# Patient Record
Sex: FEMALE | Race: BLACK OR AFRICAN AMERICAN | NOT HISPANIC OR LATINO | URBAN - METROPOLITAN AREA
[De-identification: names, ages, dates, MRNs, and addresses within clinical notes are randomized per-mention and may not be internally consistent; named-entity substitution may affect disease eponyms.]

---

## 2018-02-14 ENCOUNTER — EMERGENCY (EMERGENCY)
Facility: HOSPITAL | Age: 78
LOS: 1 days | Discharge: HOME | End: 2018-02-14
Attending: EMERGENCY MEDICINE | Admitting: EMERGENCY MEDICINE
Payer: MEDICARE

## 2018-02-14 VITALS
DIASTOLIC BLOOD PRESSURE: 63 MMHG | HEART RATE: 89 BPM | OXYGEN SATURATION: 99 % | SYSTOLIC BLOOD PRESSURE: 152 MMHG | RESPIRATION RATE: 18 BRPM | TEMPERATURE: 98 F

## 2018-02-14 DIAGNOSIS — Z90.710 ACQUIRED ABSENCE OF BOTH CERVIX AND UTERUS: Chronic | ICD-10-CM

## 2018-02-14 DIAGNOSIS — Z98.89 OTHER SPECIFIED POSTPROCEDURAL STATES: Chronic | ICD-10-CM

## 2018-02-14 DIAGNOSIS — Z90.2 ACQUIRED ABSENCE OF LUNG [PART OF]: Chronic | ICD-10-CM

## 2018-02-14 LAB
ALBUMIN SERPL ELPH-MCNC: 4.3 G/DL — SIGNIFICANT CHANGE UP (ref 3.3–5)
ALP SERPL-CCNC: 54 U/L — SIGNIFICANT CHANGE UP (ref 40–120)
ALT FLD-CCNC: 12 U/L — SIGNIFICANT CHANGE UP (ref 4–33)
AST SERPL-CCNC: 20 U/L — SIGNIFICANT CHANGE UP (ref 4–32)
BASE EXCESS BLDV CALC-SCNC: 6.2 MMOL/L — SIGNIFICANT CHANGE UP
BASOPHILS # BLD AUTO: 0.06 K/UL — SIGNIFICANT CHANGE UP (ref 0–0.2)
BASOPHILS NFR BLD AUTO: 0.6 % — SIGNIFICANT CHANGE UP (ref 0–2)
BILIRUB SERPL-MCNC: 0.2 MG/DL — SIGNIFICANT CHANGE UP (ref 0.2–1.2)
BLOOD GAS VENOUS - CREATININE: 1.2 MG/DL — SIGNIFICANT CHANGE UP (ref 0.5–1.3)
BUN SERPL-MCNC: 19 MG/DL — SIGNIFICANT CHANGE UP (ref 7–23)
CALCIUM SERPL-MCNC: 9.1 MG/DL — SIGNIFICANT CHANGE UP (ref 8.4–10.5)
CHLORIDE BLDV-SCNC: 102 MMOL/L — SIGNIFICANT CHANGE UP (ref 96–108)
CHLORIDE SERPL-SCNC: 98 MMOL/L — SIGNIFICANT CHANGE UP (ref 98–107)
CK SERPL-CCNC: 120 U/L — SIGNIFICANT CHANGE UP (ref 25–170)
CO2 SERPL-SCNC: 28 MMOL/L — SIGNIFICANT CHANGE UP (ref 22–31)
CREAT SERPL-MCNC: 1.27 MG/DL — SIGNIFICANT CHANGE UP (ref 0.5–1.3)
EOSINOPHIL # BLD AUTO: 0.33 K/UL — SIGNIFICANT CHANGE UP (ref 0–0.5)
EOSINOPHIL NFR BLD AUTO: 3.4 % — SIGNIFICANT CHANGE UP (ref 0–6)
GAS PNL BLDV: 137 MMOL/L — SIGNIFICANT CHANGE UP (ref 136–146)
GLUCOSE BLDV-MCNC: 165 — HIGH (ref 70–99)
GLUCOSE SERPL-MCNC: 172 MG/DL — HIGH (ref 70–99)
HCO3 BLDV-SCNC: 29 MMOL/L — HIGH (ref 20–27)
HCT VFR BLD CALC: 37.7 % — SIGNIFICANT CHANGE UP (ref 34.5–45)
HCT VFR BLDV CALC: 36.1 % — SIGNIFICANT CHANGE UP (ref 34.5–45)
HGB BLD-MCNC: 12.1 G/DL — SIGNIFICANT CHANGE UP (ref 11.5–15.5)
HGB BLDV-MCNC: 11.7 G/DL — SIGNIFICANT CHANGE UP (ref 11.5–15.5)
IMM GRANULOCYTES # BLD AUTO: 0.03 # — SIGNIFICANT CHANGE UP
IMM GRANULOCYTES NFR BLD AUTO: 0.3 % — SIGNIFICANT CHANGE UP (ref 0–1.5)
LACTATE BLDV-MCNC: 1.6 MMOL/L — SIGNIFICANT CHANGE UP (ref 0.5–2)
LYMPHOCYTES # BLD AUTO: 1.86 K/UL — SIGNIFICANT CHANGE UP (ref 1–3.3)
LYMPHOCYTES # BLD AUTO: 19.4 % — SIGNIFICANT CHANGE UP (ref 13–44)
MCHC RBC-ENTMCNC: 29.3 PG — SIGNIFICANT CHANGE UP (ref 27–34)
MCHC RBC-ENTMCNC: 32.1 % — SIGNIFICANT CHANGE UP (ref 32–36)
MCV RBC AUTO: 91.3 FL — SIGNIFICANT CHANGE UP (ref 80–100)
MONOCYTES # BLD AUTO: 0.66 K/UL — SIGNIFICANT CHANGE UP (ref 0–0.9)
MONOCYTES NFR BLD AUTO: 6.9 % — SIGNIFICANT CHANGE UP (ref 2–14)
NEUTROPHILS # BLD AUTO: 6.65 K/UL — SIGNIFICANT CHANGE UP (ref 1.8–7.4)
NEUTROPHILS NFR BLD AUTO: 69.4 % — SIGNIFICANT CHANGE UP (ref 43–77)
NRBC # FLD: 0 — SIGNIFICANT CHANGE UP
PCO2 BLDV: 51 MMHG — SIGNIFICANT CHANGE UP (ref 41–51)
PH BLDV: 7.4 PH — SIGNIFICANT CHANGE UP (ref 7.32–7.43)
PLATELET # BLD AUTO: 279 K/UL — SIGNIFICANT CHANGE UP (ref 150–400)
PMV BLD: 9.6 FL — SIGNIFICANT CHANGE UP (ref 7–13)
PO2 BLDV: 65 MMHG — HIGH (ref 35–40)
POTASSIUM BLDV-SCNC: 3.8 MMOL/L — SIGNIFICANT CHANGE UP (ref 3.4–4.5)
POTASSIUM SERPL-MCNC: 4.2 MMOL/L — SIGNIFICANT CHANGE UP (ref 3.5–5.3)
POTASSIUM SERPL-SCNC: 4.2 MMOL/L — SIGNIFICANT CHANGE UP (ref 3.5–5.3)
PROT SERPL-MCNC: 7.8 G/DL — SIGNIFICANT CHANGE UP (ref 6–8.3)
RBC # BLD: 4.13 M/UL — SIGNIFICANT CHANGE UP (ref 3.8–5.2)
RBC # FLD: 13.7 % — SIGNIFICANT CHANGE UP (ref 10.3–14.5)
SAO2 % BLDV: 91.4 % — HIGH (ref 60–85)
SODIUM SERPL-SCNC: 140 MMOL/L — SIGNIFICANT CHANGE UP (ref 135–145)
WBC # BLD: 9.59 K/UL — SIGNIFICANT CHANGE UP (ref 3.8–10.5)
WBC # FLD AUTO: 9.59 K/UL — SIGNIFICANT CHANGE UP (ref 3.8–10.5)

## 2018-02-14 PROCEDURE — 70450 CT HEAD/BRAIN W/O DYE: CPT | Mod: 26

## 2018-02-14 PROCEDURE — 93010 ELECTROCARDIOGRAM REPORT: CPT

## 2018-02-14 PROCEDURE — 99285 EMERGENCY DEPT VISIT HI MDM: CPT | Mod: 25

## 2018-02-14 NOTE — ED ADULT TRIAGE NOTE - CHIEF COMPLAINT QUOTE
Patient was sitting and eating dinner, became limp, dazed for a couple of minutes, When she came around she was confused and had trouble speaking. . All symptoms have subsided. She is back at her baseline. She had a similar episode about a year ago.

## 2018-02-14 NOTE — ED ADULT NURSE NOTE - OBJECTIVE STATEMENT
Pt arrives to ED s/p a few moments of witnessed confusion while eating dinner.  Pt was "dazed" and confused and went limp briefly. Pt currently A&Ox3 and all witnessed symptoms have subsided.  Dr. Brown assessing pt at bedside.  20g iv placed to LAC, labs drawn and sent.  Pt family at bedside.  Pt denies any pain.  Pt states pt had a similar episode a year ago.  Awaiting urine sample.

## 2018-02-14 NOTE — ED PROVIDER NOTE - CRANIAL NERVE AND PUPILLARY EXAM
central vision intact/extra-ocular movements intact/peripheral vision intact/cranial nerves 2-12 intact

## 2018-02-14 NOTE — ED PROVIDER NOTE - OBJECTIVE STATEMENT
76 yo f, former smoker, hx non insulin dependent T2DM, HTN, lung cancer s/p L lobectomy 2016, fo 76 yo f, former smoker x 30 years ago, hx non insulin dependent T2DM, HTN, lung cancer s/p L lobectomy 2016, BIBA, accompanied by daughter and granddaughter who states patient was unresponsive at dinner time for 3-4 minutes. Granddaughter states that she was sitting across from her and noticed her eyes roll back, head drop and not responding for several minutes, then coughing up undigested food on her own, with speech "different" (denies slurred speech but states it was not her voice). Pt reports remembering this incident as well as what happened before/after. Denies biting tongue/urinating on herself. FSG on EMS arrival 239. Pt's daughter reports similar episode one year ago. Denies history of seizures, stroke, or MI. Denies fevers, chills, n/v, changes in vision, HA, motor weakness/parasthesias/tingling. walks with walker at baseline.

## 2018-02-14 NOTE — ED ADULT NURSE NOTE - CHPI ED SYMPTOMS NEG
no diaphoresis/no back pain/no chest pain/no dizziness/no fever/no chills/no shortness of breath/no vomiting/no nausea/no cough

## 2018-02-14 NOTE — ED PROVIDER NOTE - ATTENDING CONTRIBUTION TO CARE
IRMA Attending Note - Dr. Brown  76 yo f, former smoker x 30 years ago, hx non insulin dependent T2DM, HTN, lung cancer s/p L lobectomy 2016, BIBA, accompanied by daughter and granddaughter who states patient was unresponsive at dinner time for 3-4 minutes and then had an episode of coughing..  PE: pt is alert and oriented and back to base line. , perrl, ent normal, membranes are moist, neck supple. no lymphadenopathy or thyroid enlargement, No JVD.  Chest clear to P&A, Heart- reg rhythm without murmur, rubs or gallops, radial pulses equal bilaterally.  Abd is soft, non-tender, Bowel sounds are active. no mass or organomegaly. : No CVA tenderness. Neuro:  Pt alert and oriented x 3. Perrl    Distal neurosensory is intact. Motor function is 5/5 strength bilaterally.  No focal deficits. Extremities:  No edema.  Skin: warm and dry.  Impression: syncope   Plan:  Labs with CBC to R/O anemia, CMP,U/A looking for causes of weakness: such as infection, electrolyte abnormality, dehydration, EKG looking for cardiac etiology,  endocrinopathies such as Thyroid, and hyperglycemia, CXR to R/O pneumonia

## 2018-02-14 NOTE — ED PROVIDER NOTE - PROGRESS NOTE DETAILS
Seferino att: Spoke with family who would like to take their mother/grandmother home as well as the patient who agrees and is AAOx 3. The family says they have a good relationship with their pmd (Dr. Jaycob ragsdale [phone]) and can see him this morning. After speaking with pmd, he agrees that he can facilitate an echo, cards follow up this morning. If troponin and ua is wnl, we may d/c to follow with pmd in a few hours.   I explained the risks of d/cing the pt without a full inpatient syncope work-up and the pt and family insist on discharge despite understanding those risks.

## 2018-02-14 NOTE — ED ADULT NURSE NOTE - PMH
Coronary arteriosclerosis    Diabetes    Former smoker  Quit ~2008  Hyperlipidemia    Hypertension    Lung cancer  (s/p left lobectomy)  Peripheral arterial disease    Valvular heart disease

## 2018-02-14 NOTE — ED ADULT NURSE NOTE - PSH
H/O:  section    S/P coronary angiogram  no stent.  50% stenosis mid RCA [facility] otherwise no stenosis  S/P lobectomy of lung    S/P total abdominal hysterectomy

## 2018-02-15 VITALS
TEMPERATURE: 98 F | RESPIRATION RATE: 18 BRPM | SYSTOLIC BLOOD PRESSURE: 132 MMHG | DIASTOLIC BLOOD PRESSURE: 54 MMHG | HEART RATE: 93 BPM | OXYGEN SATURATION: 98 %

## 2018-02-15 LAB
APPEARANCE UR: CLEAR — SIGNIFICANT CHANGE UP
BACTERIA # UR AUTO: SIGNIFICANT CHANGE UP
BILIRUB UR-MCNC: NEGATIVE — SIGNIFICANT CHANGE UP
BLOOD UR QL VISUAL: NEGATIVE — SIGNIFICANT CHANGE UP
CK MB BLD-MCNC: 2.11 NG/ML — SIGNIFICANT CHANGE UP (ref 1–4.7)
CK MB BLD-MCNC: SIGNIFICANT CHANGE UP (ref 0–2.5)
CK SERPL-CCNC: 120 U/L — SIGNIFICANT CHANGE UP (ref 25–170)
COLOR SPEC: YELLOW — SIGNIFICANT CHANGE UP
GLUCOSE UR-MCNC: NEGATIVE — SIGNIFICANT CHANGE UP
HYALINE CASTS # UR AUTO: SIGNIFICANT CHANGE UP (ref 0–?)
KETONES UR-MCNC: NEGATIVE — SIGNIFICANT CHANGE UP
LEUKOCYTE ESTERASE UR-ACNC: NEGATIVE — SIGNIFICANT CHANGE UP
MUCOUS THREADS # UR AUTO: SIGNIFICANT CHANGE UP
NITRITE UR-MCNC: NEGATIVE — SIGNIFICANT CHANGE UP
PH UR: 5.5 — SIGNIFICANT CHANGE UP (ref 4.6–8)
PROT UR-MCNC: 20 MG/DL — SIGNIFICANT CHANGE UP
RBC CASTS # UR COMP ASSIST: SIGNIFICANT CHANGE UP (ref 0–?)
SP GR SPEC: 1.02 — SIGNIFICANT CHANGE UP (ref 1–1.04)
SQUAMOUS # UR AUTO: SIGNIFICANT CHANGE UP
TROPONIN T SERPL-MCNC: < 0.06 NG/ML — SIGNIFICANT CHANGE UP (ref 0–0.06)
UROBILINOGEN FLD QL: NORMAL MG/DL — SIGNIFICANT CHANGE UP
WBC UR QL: SIGNIFICANT CHANGE UP (ref 0–?)

## 2019-02-27 ENCOUNTER — EMERGENCY (EMERGENCY)
Facility: HOSPITAL | Age: 79
LOS: 1 days | Discharge: HOME | End: 2019-02-27
Attending: EMERGENCY MEDICINE | Admitting: EMERGENCY MEDICINE
Payer: MEDICARE

## 2019-02-27 VITALS
TEMPERATURE: 97 F | HEART RATE: 81 BPM | SYSTOLIC BLOOD PRESSURE: 140 MMHG | RESPIRATION RATE: 17 BRPM | OXYGEN SATURATION: 99 % | DIASTOLIC BLOOD PRESSURE: 61 MMHG

## 2019-02-27 DIAGNOSIS — Z98.89 OTHER SPECIFIED POSTPROCEDURAL STATES: Chronic | ICD-10-CM

## 2019-02-27 DIAGNOSIS — Z90.710 ACQUIRED ABSENCE OF BOTH CERVIX AND UTERUS: Chronic | ICD-10-CM

## 2019-02-27 DIAGNOSIS — Z90.2 ACQUIRED ABSENCE OF LUNG [PART OF]: Chronic | ICD-10-CM

## 2019-02-27 LAB
ANION GAP SERPL CALC-SCNC: 17 MMO/L — HIGH (ref 7–14)
BASOPHILS # BLD AUTO: 0.04 K/UL — SIGNIFICANT CHANGE UP (ref 0–0.2)
BASOPHILS NFR BLD AUTO: 0.6 % — SIGNIFICANT CHANGE UP (ref 0–2)
BUN SERPL-MCNC: 23 MG/DL — SIGNIFICANT CHANGE UP (ref 7–23)
CALCIUM SERPL-MCNC: 9.6 MG/DL — SIGNIFICANT CHANGE UP (ref 8.4–10.5)
CHLORIDE SERPL-SCNC: 99 MMOL/L — SIGNIFICANT CHANGE UP (ref 98–107)
CO2 SERPL-SCNC: 21 MMOL/L — LOW (ref 22–31)
CREAT SERPL-MCNC: 0.96 MG/DL — SIGNIFICANT CHANGE UP (ref 0.5–1.3)
EOSINOPHIL # BLD AUTO: 0.07 K/UL — SIGNIFICANT CHANGE UP (ref 0–0.5)
EOSINOPHIL NFR BLD AUTO: 1 % — SIGNIFICANT CHANGE UP (ref 0–6)
GLUCOSE SERPL-MCNC: 245 MG/DL — HIGH (ref 70–99)
HCT VFR BLD CALC: 38.1 % — SIGNIFICANT CHANGE UP (ref 34.5–45)
HGB BLD-MCNC: 11.6 G/DL — SIGNIFICANT CHANGE UP (ref 11.5–15.5)
IMM GRANULOCYTES NFR BLD AUTO: 0.4 % — SIGNIFICANT CHANGE UP (ref 0–1.5)
LYMPHOCYTES # BLD AUTO: 1.63 K/UL — SIGNIFICANT CHANGE UP (ref 1–3.3)
LYMPHOCYTES # BLD AUTO: 23.9 % — SIGNIFICANT CHANGE UP (ref 13–44)
MCHC RBC-ENTMCNC: 29.1 PG — SIGNIFICANT CHANGE UP (ref 27–34)
MCHC RBC-ENTMCNC: 30.4 % — LOW (ref 32–36)
MCV RBC AUTO: 95.7 FL — SIGNIFICANT CHANGE UP (ref 80–100)
MONOCYTES # BLD AUTO: 0.51 K/UL — SIGNIFICANT CHANGE UP (ref 0–0.9)
MONOCYTES NFR BLD AUTO: 7.5 % — SIGNIFICANT CHANGE UP (ref 2–14)
NEUTROPHILS # BLD AUTO: 4.55 K/UL — SIGNIFICANT CHANGE UP (ref 1.8–7.4)
NEUTROPHILS NFR BLD AUTO: 66.6 % — SIGNIFICANT CHANGE UP (ref 43–77)
NRBC # FLD: 0 K/UL — LOW (ref 25–125)
PLATELET # BLD AUTO: 292 K/UL — SIGNIFICANT CHANGE UP (ref 150–400)
PMV BLD: 10.3 FL — SIGNIFICANT CHANGE UP (ref 7–13)
POTASSIUM SERPL-MCNC: 5.8 MMOL/L — HIGH (ref 3.5–5.3)
POTASSIUM SERPL-SCNC: 5.8 MMOL/L — HIGH (ref 3.5–5.3)
RBC # BLD: 3.98 M/UL — SIGNIFICANT CHANGE UP (ref 3.8–5.2)
RBC # FLD: 14.6 % — HIGH (ref 10.3–14.5)
SODIUM SERPL-SCNC: 137 MMOL/L — SIGNIFICANT CHANGE UP (ref 135–145)
WBC # BLD: 6.83 K/UL — SIGNIFICANT CHANGE UP (ref 3.8–10.5)
WBC # FLD AUTO: 6.83 K/UL — SIGNIFICANT CHANGE UP (ref 3.8–10.5)

## 2019-02-27 PROCEDURE — 99283 EMERGENCY DEPT VISIT LOW MDM: CPT | Mod: 25

## 2019-02-27 PROCEDURE — 99053 MED SERV 10PM-8AM 24 HR FAC: CPT

## 2019-02-27 NOTE — ED PROVIDER NOTE - NSFOLLOWUPINSTRUCTIONS_ED_ALL_ED_FT
Rest, drink plenty of fluids.  Advance activity as tolerated. Advised to eat regular food, no skipping meals. If skip meal, avoid overuse of diabetes medication.  Continue all previously prescribed medications as directed.  Follow up with your primary care physician in 48-72 hours- bring copies of your results.  Return to the ER for worsening or persistent symptoms, and/or ANY NEW OR CONCERNING SYMPTOMS. If you have issues obtaining follow up, please call: [phone] to obtain a doctor or specialist who takes your insurance in your area.

## 2019-02-27 NOTE — ED PROVIDER NOTE - CLINICAL SUMMARY MEDICAL DECISION MAKING FREE TEXT BOX
79 yo F, former smoker with PMH of NIDDM on (Metformin and Repaglinide), HTN, CAD w/ stent on Plavix, BIBEMS c/o hypoglycemia this morning a/w slurred speech. Well appearing elderly female p/w hypoglycemia with FS of 67, 51, a/w slurred speech, taking Repaglinide and Metformin, skipped dinner last night, hx of similar episode in the past, no focal neuro deficits, no facial droop, FS in , no fall, no head trauma/injury, patient is at baseline, speaking clearly in full sentences. Pt's symptom likely due to hypoglycemia taking Repaglinide& Metformin and not eating. Plan: feed breakfast, check labs, and monitor fingerstick. Isaias: 77 yo F, former smoker with PMH of NIDDM on (Metformin and Repaglinide), HTN, CAD w/ stent on Plavix, BIBEMS c/o hypoglycemia this morning a/w slurred speech. Well appearing elderly female p/w hypoglycemia with FS of 67, 51, a/w slurred speech, taking Repaglinide and Metformin, skipped dinner last night but took her evening dose of Repaglinide, hx of similar episode in the past when skipping meal, no focal neuro deficits, no facial droop at this time, FS in , no fall, no head trauma/injury, patient is at baseline, speaking clearly in full sentences. Pt's symptom likely due to hypoglycemia taking Repaglinide& Metformin and not eating. Plan: feed breakfast, check labs, and monitor fingerstick.

## 2019-02-27 NOTE — ED PROVIDER NOTE - PROGRESS NOTE DETAILS
PA DONOVAN: Patient reassessed, lying comfortably in bed in NAD, denies any complaints. As per granddaughter, pt ate breakfast, no other complaints. Will repeat FS.

## 2019-02-27 NOTE — ED ADULT TRIAGE NOTE - CHIEF COMPLAINT QUOTE
Patient from home c/o hypoglycemia, slurred speech and facial droop (which has currently subsided prior to arrival per family). Per granddaughter patient FS 67 when waking up, and was given 2 glasses of OJ and a banana. Per family, patient was last seen well at midnight prior to sleeping. Patient denies any insulin use; "only metformin" for DM. Hx. DM, HTN, stentx1, lung ca.  in triage.

## 2019-02-27 NOTE — ED ADULT NURSE NOTE - OBJECTIVE STATEMENT
Patient to room 13 and was brought to ER by family with a concern with CVA. Pt was hypoglycemic with slurred speech. Pt presented to ER, alert and oriented times three, and family at bedside. IVL placed to left arm 20 gauge and labs drawn and sent. Waiting for results and disposition.   JOSEPH Beavers

## 2019-02-27 NOTE — ED PROVIDER NOTE - OBJECTIVE STATEMENT
79 yo F, former smoker with PMH of NIDDM on (Metformin and Repaglinide), HTN, CAD BIBEMS c/o hypoglycemia this morning a/w slurred speech. Pt states she woke up this morning at 6am, has slurred speech. Reports she didn't eat dinner, only had a snack. As per granddaughter at bedside patient's fingerstick was 67 initially, and rechecked 51 when EMS arrived. Pt was given banana and orange juice and was back to baseline shortly. Pt admits having similar episode in the past. Denies any fever, chills, headache, dizziness, neck pain, blurry vision, n/v/d/c, chest pain, sob, weakness, numbness, recent fall, head injury/trauma, recent travel, or any other complaints.

## 2019-04-04 ENCOUNTER — APPOINTMENT (OUTPATIENT)
Dept: VASCULAR SURGERY | Facility: CLINIC | Age: 79
End: 2019-04-04
Payer: MEDICARE

## 2019-04-04 VITALS — SYSTOLIC BLOOD PRESSURE: 130 MMHG | HEART RATE: 75 BPM | DIASTOLIC BLOOD PRESSURE: 76 MMHG

## 2019-04-04 VITALS
WEIGHT: 135 LBS | HEIGHT: 60 IN | SYSTOLIC BLOOD PRESSURE: 148 MMHG | HEART RATE: 79 BPM | DIASTOLIC BLOOD PRESSURE: 65 MMHG | BODY MASS INDEX: 26.5 KG/M2 | TEMPERATURE: 97.7 F

## 2019-04-04 DIAGNOSIS — M79.89 OTHER SPECIFIED SOFT TISSUE DISORDERS: ICD-10-CM

## 2019-04-04 PROCEDURE — 99203 OFFICE O/P NEW LOW 30 MIN: CPT

## 2019-04-05 PROBLEM — M79.89 LIMB SWELLING: Status: ACTIVE | Noted: 2019-04-05

## 2019-04-29 ENCOUNTER — APPOINTMENT (OUTPATIENT)
Dept: VASCULAR SURGERY | Facility: CLINIC | Age: 79
End: 2019-04-29

## 2020-08-03 ENCOUNTER — EMERGENCY (EMERGENCY)
Facility: HOSPITAL | Age: 80
LOS: 1 days | Discharge: HOME | End: 2020-08-03
Attending: EMERGENCY MEDICINE | Admitting: EMERGENCY MEDICINE
Payer: COMMERCIAL

## 2020-08-03 VITALS
RESPIRATION RATE: 18 BRPM | OXYGEN SATURATION: 96 % | HEART RATE: 78 BPM | SYSTOLIC BLOOD PRESSURE: 136 MMHG | DIASTOLIC BLOOD PRESSURE: 58 MMHG | TEMPERATURE: 98 F

## 2020-08-03 VITALS
SYSTOLIC BLOOD PRESSURE: 129 MMHG | OXYGEN SATURATION: 99 % | TEMPERATURE: 99 F | HEART RATE: 80 BPM | RESPIRATION RATE: 18 BRPM | DIASTOLIC BLOOD PRESSURE: 52 MMHG

## 2020-08-03 DIAGNOSIS — Z98.89 OTHER SPECIFIED POSTPROCEDURAL STATES: Chronic | ICD-10-CM

## 2020-08-03 DIAGNOSIS — Z90.710 ACQUIRED ABSENCE OF BOTH CERVIX AND UTERUS: Chronic | ICD-10-CM

## 2020-08-03 DIAGNOSIS — Z90.2 ACQUIRED ABSENCE OF LUNG [PART OF]: Chronic | ICD-10-CM

## 2020-08-03 LAB
ALBUMIN SERPL ELPH-MCNC: 4 G/DL — SIGNIFICANT CHANGE UP (ref 3.3–5)
ALP SERPL-CCNC: 47 U/L — SIGNIFICANT CHANGE UP (ref 40–120)
ALT FLD-CCNC: 13 U/L — SIGNIFICANT CHANGE UP (ref 4–33)
ANION GAP SERPL CALC-SCNC: 15 MMO/L — HIGH (ref 7–14)
APPEARANCE UR: SIGNIFICANT CHANGE UP
APTT BLD: 26 SEC — LOW (ref 27–36.3)
AST SERPL-CCNC: 16 U/L — SIGNIFICANT CHANGE UP (ref 4–32)
BACTERIA # UR AUTO: HIGH
BASOPHILS # BLD AUTO: 0.02 K/UL — SIGNIFICANT CHANGE UP (ref 0–0.2)
BASOPHILS NFR BLD AUTO: 0.3 % — SIGNIFICANT CHANGE UP (ref 0–2)
BILIRUB SERPL-MCNC: 0.3 MG/DL — SIGNIFICANT CHANGE UP (ref 0.2–1.2)
BILIRUB UR-MCNC: NEGATIVE — SIGNIFICANT CHANGE UP
BLOOD UR QL VISUAL: NEGATIVE — SIGNIFICANT CHANGE UP
BUN SERPL-MCNC: 19 MG/DL — SIGNIFICANT CHANGE UP (ref 7–23)
CALCIUM SERPL-MCNC: 9 MG/DL — SIGNIFICANT CHANGE UP (ref 8.4–10.5)
CHLORIDE SERPL-SCNC: 100 MMOL/L — SIGNIFICANT CHANGE UP (ref 98–107)
CO2 SERPL-SCNC: 21 MMOL/L — LOW (ref 22–31)
COLOR SPEC: YELLOW — SIGNIFICANT CHANGE UP
CREAT SERPL-MCNC: 1.28 MG/DL — SIGNIFICANT CHANGE UP (ref 0.5–1.3)
EOSINOPHIL # BLD AUTO: 0.03 K/UL — SIGNIFICANT CHANGE UP (ref 0–0.5)
EOSINOPHIL NFR BLD AUTO: 0.4 % — SIGNIFICANT CHANGE UP (ref 0–6)
GLUCOSE SERPL-MCNC: 137 MG/DL — HIGH (ref 70–99)
GLUCOSE UR-MCNC: NEGATIVE — SIGNIFICANT CHANGE UP
HCT VFR BLD CALC: 37.8 % — SIGNIFICANT CHANGE UP (ref 34.5–45)
HGB BLD-MCNC: 11.9 G/DL — SIGNIFICANT CHANGE UP (ref 11.5–15.5)
HYALINE CASTS # UR AUTO: SIGNIFICANT CHANGE UP
IMM GRANULOCYTES NFR BLD AUTO: 0.1 % — SIGNIFICANT CHANGE UP (ref 0–1.5)
INR BLD: 1.04 — SIGNIFICANT CHANGE UP (ref 0.88–1.17)
KETONES UR-MCNC: NEGATIVE — SIGNIFICANT CHANGE UP
LEUKOCYTE ESTERASE UR-ACNC: SIGNIFICANT CHANGE UP
LIDOCAIN IGE QN: 78.2 U/L — HIGH (ref 7–60)
LYMPHOCYTES # BLD AUTO: 1.6 K/UL — SIGNIFICANT CHANGE UP (ref 1–3.3)
LYMPHOCYTES # BLD AUTO: 23.9 % — SIGNIFICANT CHANGE UP (ref 13–44)
MAGNESIUM SERPL-MCNC: 1.6 MG/DL — SIGNIFICANT CHANGE UP (ref 1.6–2.6)
MCHC RBC-ENTMCNC: 29.2 PG — SIGNIFICANT CHANGE UP (ref 27–34)
MCHC RBC-ENTMCNC: 31.5 % — LOW (ref 32–36)
MCV RBC AUTO: 92.6 FL — SIGNIFICANT CHANGE UP (ref 80–100)
MONOCYTES # BLD AUTO: 0.76 K/UL — SIGNIFICANT CHANGE UP (ref 0–0.9)
MONOCYTES NFR BLD AUTO: 11.4 % — SIGNIFICANT CHANGE UP (ref 2–14)
NEUTROPHILS # BLD AUTO: 4.27 K/UL — SIGNIFICANT CHANGE UP (ref 1.8–7.4)
NEUTROPHILS NFR BLD AUTO: 63.9 % — SIGNIFICANT CHANGE UP (ref 43–77)
NITRITE UR-MCNC: NEGATIVE — SIGNIFICANT CHANGE UP
NRBC # FLD: 0 K/UL — SIGNIFICANT CHANGE UP (ref 0–0)
PH UR: 6 — SIGNIFICANT CHANGE UP (ref 5–8)
PLATELET # BLD AUTO: 252 K/UL — SIGNIFICANT CHANGE UP (ref 150–400)
PMV BLD: 9.7 FL — SIGNIFICANT CHANGE UP (ref 7–13)
POTASSIUM SERPL-MCNC: 3.9 MMOL/L — SIGNIFICANT CHANGE UP (ref 3.5–5.3)
POTASSIUM SERPL-SCNC: 3.9 MMOL/L — SIGNIFICANT CHANGE UP (ref 3.5–5.3)
PROT SERPL-MCNC: 7.3 G/DL — SIGNIFICANT CHANGE UP (ref 6–8.3)
PROT UR-MCNC: 20 — SIGNIFICANT CHANGE UP
PROTHROM AB SERPL-ACNC: 11.8 SEC — SIGNIFICANT CHANGE UP (ref 10.6–13.6)
RBC # BLD: 4.08 M/UL — SIGNIFICANT CHANGE UP (ref 3.8–5.2)
RBC # FLD: 14 % — SIGNIFICANT CHANGE UP (ref 10.3–14.5)
RBC CASTS # UR COMP ASSIST: SIGNIFICANT CHANGE UP (ref 0–?)
SODIUM SERPL-SCNC: 136 MMOL/L — SIGNIFICANT CHANGE UP (ref 135–145)
SP GR SPEC: 1.01 — SIGNIFICANT CHANGE UP (ref 1–1.04)
SQUAMOUS # UR AUTO: SIGNIFICANT CHANGE UP
TROPONIN T, HIGH SENSITIVITY: 18 NG/L — SIGNIFICANT CHANGE UP (ref ?–14)
TROPONIN T, HIGH SENSITIVITY: 18 NG/L — SIGNIFICANT CHANGE UP (ref ?–14)
UROBILINOGEN FLD QL: NORMAL — SIGNIFICANT CHANGE UP
WBC # BLD: 6.69 K/UL — SIGNIFICANT CHANGE UP (ref 3.8–10.5)
WBC # FLD AUTO: 6.69 K/UL — SIGNIFICANT CHANGE UP (ref 3.8–10.5)
WBC UR QL: >50 — HIGH (ref 0–?)

## 2020-08-03 PROCEDURE — 99284 EMERGENCY DEPT VISIT MOD MDM: CPT

## 2020-08-03 PROCEDURE — 71046 X-RAY EXAM CHEST 2 VIEWS: CPT | Mod: 26

## 2020-08-03 PROCEDURE — 74177 CT ABD & PELVIS W/CONTRAST: CPT | Mod: 26

## 2020-08-03 NOTE — ED PROVIDER NOTE - NS ED ROS FT
CONSTITUTIONAL: No fever,  EYES: No redness  ENT: no sore throat  CARDIOVASCULAR: No chest pain,  RESPIRATORY: No cough, no shortness of breath  GI: +++ abdominal pain, no nausea, no vomiting,  GENITOURINARY: No dysuria  MUSKULOSKELETAL: No new pain in joints/muscles  SKIN: No rash  NEURO: No headache  ALL OTHER SYSTEMS NEGATIVE.

## 2020-08-03 NOTE — ED PROVIDER NOTE - PHYSICAL EXAMINATION
CONSTITUTIONAL: Non-toxic, non-diaphoretic, in no apparent distress  HEAD: Normocephalic; atruamatic  EYES: EOM intact   ENMT: External appears normal; normal oropharynx, moist  NECK: grossly normal active ROM,  CARD: No cyanosis, good peripheral perfusion, RRR, no MRG  RESP: Normal chest excursion with respiration; no increased work of breathing, CTAB  ABD: +++ttp in llq, no rebount, no guarding  EXT: moving all extremities, no gross disfigurement or asymmetry,  SKIN: Warm, dry, no rash  NEURO:  moving all extremities, no facial droop, no dysarthria      cn2-12 intact

## 2020-08-03 NOTE — ED PROVIDER NOTE - OBJECTIVE STATEMENT
79 y F pmh dm, htn, , lung ca in remission s/p resection in 2016  pw 5 days of periumbilical pain  mild in nature does not want pain meds  also has had frequent episodes of yellow-geeta, non-bloody diarrhea  abdominal pain is intermittent and relieved with defecation  no vomiting, no fevers  pmd sent her in a ct scan

## 2020-08-03 NOTE — ED PROVIDER NOTE - ATTENDING CONTRIBUTION TO CARE
DR. BLOCH, ATTENDING MD-  I performed a face to face bedside interview with patient regarding history of present illness, review of symptoms and past medical history. I completed an independent physical exam.  I have discussed patient's plan of care with the fellow.  Well appearing NAd HEENT nml heart sounds nml lungs clear, abd soft tender LLQ, no CVA tenderness.skin nl neuro nml, pulses intact, no edema.

## 2020-08-03 NOTE — ED ADULT NURSE REASSESSMENT NOTE - NS ED NURSE REASSESS COMMENT FT1
Receiving pt. from day RN. No c/o pain/discomfort. NSR on cardiac monitor. Will continue to monitor.

## 2020-08-03 NOTE — ED PROVIDER NOTE - PATIENT PORTAL LINK FT
You can access the FollowMyHealth Patient Portal offered by [facility] by registering at the following website: [email or web address]. By joining [email or web address]’s FollowMyHealth portal, you will also be able to view your health information using other applications (apps) compatible with our system.

## 2021-03-19 ENCOUNTER — OUTPATIENT (OUTPATIENT)
Dept: OUTPATIENT SERVICES | Facility: HOSPITAL | Age: 81
LOS: 1 days | End: 2021-03-19
Payer: MEDICARE

## 2021-03-19 VITALS
SYSTOLIC BLOOD PRESSURE: 160 MMHG | HEIGHT: 63 IN | RESPIRATION RATE: 16 BRPM | TEMPERATURE: 97 F | HEART RATE: 70 BPM | DIASTOLIC BLOOD PRESSURE: 80 MMHG | OXYGEN SATURATION: 98 % | WEIGHT: 139.99 LBS

## 2021-03-19 DIAGNOSIS — Z12.9 ENCOUNTER FOR SCREENING FOR MALIGNANT NEOPLASM, SITE UNSPECIFIED: ICD-10-CM

## 2021-03-19 DIAGNOSIS — Z87.42 PERSONAL HISTORY OF OTHER DISEASES OF THE FEMALE GENITAL TRACT: Chronic | ICD-10-CM

## 2021-03-19 DIAGNOSIS — E11.9 TYPE 2 DIABETES MELLITUS WITHOUT COMPLICATIONS: ICD-10-CM

## 2021-03-19 DIAGNOSIS — Z98.89 OTHER SPECIFIED POSTPROCEDURAL STATES: Chronic | ICD-10-CM

## 2021-03-19 DIAGNOSIS — Z90.710 ACQUIRED ABSENCE OF BOTH CERVIX AND UTERUS: Chronic | ICD-10-CM

## 2021-03-19 DIAGNOSIS — E04.1 NONTOXIC SINGLE THYROID NODULE: ICD-10-CM

## 2021-03-19 DIAGNOSIS — Z90.2 ACQUIRED ABSENCE OF LUNG [PART OF]: Chronic | ICD-10-CM

## 2021-03-19 DIAGNOSIS — I25.10 ATHEROSCLEROTIC HEART DISEASE OF NATIVE CORONARY ARTERY WITHOUT ANGINA PECTORIS: ICD-10-CM

## 2021-03-19 DIAGNOSIS — Z98.890 OTHER SPECIFIED POSTPROCEDURAL STATES: Chronic | ICD-10-CM

## 2021-03-19 LAB
A1C WITH ESTIMATED AVERAGE GLUCOSE RESULT: 6.2 % — HIGH (ref 4–5.6)
ANION GAP SERPL CALC-SCNC: 13 MMOL/L — SIGNIFICANT CHANGE UP (ref 7–14)
BUN SERPL-MCNC: 26 MG/DL — HIGH (ref 7–23)
CALCIUM SERPL-MCNC: 9.7 MG/DL — SIGNIFICANT CHANGE UP (ref 8.4–10.5)
CHLORIDE SERPL-SCNC: 99 MMOL/L — SIGNIFICANT CHANGE UP (ref 98–107)
CO2 SERPL-SCNC: 25 MMOL/L — SIGNIFICANT CHANGE UP (ref 22–31)
CREAT SERPL-MCNC: 0.91 MG/DL — SIGNIFICANT CHANGE UP (ref 0.5–1.3)
ESTIMATED AVERAGE GLUCOSE: 131 MG/DL — HIGH (ref 68–114)
GLUCOSE SERPL-MCNC: 122 MG/DL — HIGH (ref 70–99)
HCT VFR BLD CALC: 37.6 % — SIGNIFICANT CHANGE UP (ref 34.5–45)
HGB BLD-MCNC: 11.6 G/DL — SIGNIFICANT CHANGE UP (ref 11.5–15.5)
MCHC RBC-ENTMCNC: 28.5 PG — SIGNIFICANT CHANGE UP (ref 27–34)
MCHC RBC-ENTMCNC: 30.9 GM/DL — LOW (ref 32–36)
MCV RBC AUTO: 92.4 FL — SIGNIFICANT CHANGE UP (ref 80–100)
NRBC # BLD: 0 /100 WBCS — SIGNIFICANT CHANGE UP
NRBC # FLD: 0 K/UL — SIGNIFICANT CHANGE UP
PLATELET # BLD AUTO: 273 K/UL — SIGNIFICANT CHANGE UP (ref 150–400)
POTASSIUM SERPL-MCNC: 4.3 MMOL/L — SIGNIFICANT CHANGE UP (ref 3.5–5.3)
POTASSIUM SERPL-SCNC: 4.3 MMOL/L — SIGNIFICANT CHANGE UP (ref 3.5–5.3)
RBC # BLD: 4.07 M/UL — SIGNIFICANT CHANGE UP (ref 3.8–5.2)
RBC # FLD: 14.1 % — SIGNIFICANT CHANGE UP (ref 10.3–14.5)
SODIUM SERPL-SCNC: 137 MMOL/L — SIGNIFICANT CHANGE UP (ref 135–145)
WBC # BLD: 6.23 K/UL — SIGNIFICANT CHANGE UP (ref 3.8–10.5)
WBC # FLD AUTO: 6.23 K/UL — SIGNIFICANT CHANGE UP (ref 3.8–10.5)

## 2021-03-19 PROCEDURE — 93010 ELECTROCARDIOGRAM REPORT: CPT

## 2021-03-19 NOTE — H&P PST ADULT - NSICDXPROBLEM_GEN_ALL_CORE_FT
PROBLEM DIAGNOSES  Problem: Screening for malignant neoplasm  Assessment and Plan: Colonoscopy   Pre op instructions reviewed with pt ; pt verbalized good understanding of pre op instructions  Per pt Covid test scheduled pre op     Problem: Diabetes mellitus  Assessment and Plan: BMP, HGBA1C done 3/19/2021  FS dos     Problem: CAD (coronary artery disease)  Assessment and Plan: Pt with h/o cardiac stent  Per pt > 1 year ; pt unsure date  Pt remains on asa   Dr ragsdale to provide pre op evaluation  Email to be sent to surgeon   EKG faxed to Dr ragsdale requesting Comparison  Pt denies cp, palpitations, sob vss ,pt in nad   Request most recent ST/ Echo Dr Perea       PROBLEM DIAGNOSES  Problem: Screening for malignant neoplasm  Assessment and Plan: Colonoscopy   Pre op instructions reviewed with pt ; pt verbalized good understanding of pre op instructions  Per pt Covid test scheduled pre op    Pt to see Dr ragsdale pre op     Problem: Diabetes mellitus  Assessment and Plan: BMP, HGBA1C done 3/19/2021  FS dos     Problem: CAD (coronary artery disease)  Assessment and Plan: Pt with h/o cardiac stent 3/18/2021  Pt remains on asa   Dr Ragsdale to provide pre op evaluation   call to surgeons office ; s/w Nor ; stated pt should remain on asa  Per Nor surgeon requesting ASA be dc d 2 days pre op  Informed Nor pt to see Dr Ragsdale 3/22/2021  EKG faxed to Dr ragsdale s/w Tiki received ; tpo be reviewed by DR Ragsdale   Pt denies cp, palpitations, sob vss ,pt in nad   Request most recent ST/ Echo Dr Peera    Problem: Thyroid nodule  Assessment and Plan: Right Thyroid nodule palpated   Pt with appt 3/22/2021  Call to Dr Ragsdale s office ; s/w Titus  Aware of thyroid nodule ;         PROBLEM DIAGNOSES  Problem: Screening for malignant neoplasm  Assessment and Plan: Colonoscopy   Pre op instructions reviewed with pt ; pt verbalized good understanding of pre op instructions  Per pt Covid test scheduled pre op    Pt to see Dr ragsdale pre op     Problem: Diabetes mellitus  Assessment and Plan: BMP, HGBA1C done 3/19/2021  Pt to hold Metformin evening prior to surgery and dos   FS dos     Problem: CAD (coronary artery disease)  Assessment and Plan: Pt with h/o cardiac stent 3/18/2021; Recommended pt remain on asa pre op   Pt remains on asa @ present  Dr Ragsdale to provide pre op evaluation   call to surgeons office ; s/w Nor ; regarding aspirin   Per Nor surgeon requesting ASA be dc d 2 days pre op  Informed Nor pt to see Dr Ragsdale 3/22/2021  EKG faxed to Dr Ragsdale s/w Tiki received ; to be reviewed by DR Ragsdale   Pt denies cp, palpitations, sob vss ,pt in nad   Request most recent ST/ Echo Dr Perea    Problem: Thyroid nodule  Assessment and Plan: Right Thyroid nodule palpated   Pt with appt 3/22/2021  Call to Dr Ragsdale s office ; s/w Titus  Aware of thyroid nodule          PROBLEM DIAGNOSES  Problem: Screening for malignant neoplasm  Assessment and Plan: Colonoscopy   Pre op instructions reviewed with pt ; pt verbalized good understanding of pre op instructions  Per pt Covid test scheduled pre op    Pt to see Dr ragsdale pre op     Problem: Diabetes mellitus  Assessment and Plan: BMP, HGBA1C done 3/19/2021  Pt to hold Metformin evening prior to surgery and dos   FS dos     Problem: CAD (coronary artery disease)  Assessment and Plan: Pt with h/o cardiac stent 3/18 ; Recommended pt remain on asa pre op   Pt remains on asa @ present  Dr Ragsdale to provide pre op evaluation   Call to surgeons office ; s/w Nor ; regarding aspirin   Per Nor surgeon requesting ASA be dc d 2 days pre op  Informed Nor pt to see Dr Ragsdale 3/22/2021  EKG faxed to Dr Ragsdale s/w Tiki received ; to be reviewed by DR Ragsdale   Pt denies cp, palpitations, sob vss ,pt in nad   Request most recent ST/ Echo Dr Perea    Problem: Thyroid nodule  Assessment and Plan: Right Thyroid nodule palpated   Pt with appt 3/22/2021  Call to Dr Ragsdale s office ; s/w Titus  Aware of thyroid nodule

## 2021-03-19 NOTE — H&P PST ADULT - HISTORY OF PRESENT ILLNESS
Pt is an 80 y.o. female ; reporting recent h/o + blood in y stool " Pt to surgeon ; pt now presents for Colonoscopy.     Pt is a fair historian  Pt denies change in bowel habits  Pt is an 80 y.o. female ; reporting recent h/o + blood in   my  stool " Pt to surgeon ; pt now presents for Colonoscopy.     Pt is a fair historian  Pt denies change in bowel habits

## 2021-03-19 NOTE — H&P PST ADULT - NSICDXPASTSURGICALHX_GEN_ALL_CORE_FT
PAST SURGICAL HISTORY:  History of uterine prolapse Tx surgically    S/P thoracotomy Left ; unsure regarding exact procedure

## 2021-03-19 NOTE — H&P PST ADULT - NSICDXPASTMEDICALHX_GEN_ALL_CORE_FT
PAST MEDICAL HISTORY:  CAD (coronary artery disease)     Hypercholesterolemia     Hypertension      PAST MEDICAL HISTORY:  Asthma     CAD (coronary artery disease) cardiac stent ; pt unsure date    Diabetes mellitus     GERD (gastroesophageal reflux disease)     Gout     Hypercholesterolemia     Hypertension     Lung cancer tx surgically ; pt denies chemo , denies rt     PAST MEDICAL HISTORY:  Asthma     CAD (coronary artery disease) cardiac stent ; 3/18 per pt    Diabetes mellitus     GERD (gastroesophageal reflux disease)     Gout     Hypercholesterolemia     Hypertension     Lung cancer tx surgically ; pt denies chemo , denies rt

## 2022-06-10 ENCOUNTER — EMERGENCY (EMERGENCY)
Facility: HOSPITAL | Age: 82
LOS: 1 days | Discharge: HOME | End: 2022-06-10
Attending: EMERGENCY MEDICINE | Admitting: EMERGENCY MEDICINE
Payer: MEDICARE

## 2022-06-10 VITALS
HEIGHT: 63 IN | HEART RATE: 72 BPM | RESPIRATION RATE: 18 BRPM | DIASTOLIC BLOOD PRESSURE: 42 MMHG | SYSTOLIC BLOOD PRESSURE: 112 MMHG | TEMPERATURE: 98 F | OXYGEN SATURATION: 99 %

## 2022-06-10 VITALS
OXYGEN SATURATION: 100 % | TEMPERATURE: 99 F | HEART RATE: 77 BPM | DIASTOLIC BLOOD PRESSURE: 57 MMHG | SYSTOLIC BLOOD PRESSURE: 145 MMHG | RESPIRATION RATE: 16 BRPM

## 2022-06-10 DIAGNOSIS — Z87.42 PERSONAL HISTORY OF OTHER DISEASES OF THE FEMALE GENITAL TRACT: Chronic | ICD-10-CM

## 2022-06-10 DIAGNOSIS — Z98.890 OTHER SPECIFIED POSTPROCEDURAL STATES: Chronic | ICD-10-CM

## 2022-06-10 DIAGNOSIS — Z90.710 ACQUIRED ABSENCE OF BOTH CERVIX AND UTERUS: Chronic | ICD-10-CM

## 2022-06-10 DIAGNOSIS — Z90.2 ACQUIRED ABSENCE OF LUNG [PART OF]: Chronic | ICD-10-CM

## 2022-06-10 DIAGNOSIS — Z98.89 OTHER SPECIFIED POSTPROCEDURAL STATES: Chronic | ICD-10-CM

## 2022-06-10 PROBLEM — I25.10 ATHEROSCLEROTIC HEART DISEASE OF NATIVE CORONARY ARTERY WITHOUT ANGINA PECTORIS: Chronic | Status: ACTIVE | Noted: 2021-03-19

## 2022-06-10 PROBLEM — I10 ESSENTIAL (PRIMARY) HYPERTENSION: Chronic | Status: ACTIVE | Noted: 2021-03-19

## 2022-06-10 PROBLEM — C34.90 MALIGNANT NEOPLASM OF UNSPECIFIED PART OF UNSPECIFIED BRONCHUS OR LUNG: Chronic | Status: ACTIVE | Noted: 2021-03-19

## 2022-06-10 PROBLEM — J45.909 UNSPECIFIED ASTHMA, UNCOMPLICATED: Chronic | Status: ACTIVE | Noted: 2021-03-19

## 2022-06-10 PROBLEM — M10.9 GOUT, UNSPECIFIED: Chronic | Status: ACTIVE | Noted: 2021-03-19

## 2022-06-10 PROBLEM — E78.00 PURE HYPERCHOLESTEROLEMIA, UNSPECIFIED: Chronic | Status: ACTIVE | Noted: 2021-03-19

## 2022-06-10 PROBLEM — K21.9 GASTRO-ESOPHAGEAL REFLUX DISEASE WITHOUT ESOPHAGITIS: Chronic | Status: ACTIVE | Noted: 2021-03-19

## 2022-06-10 PROBLEM — E11.9 TYPE 2 DIABETES MELLITUS WITHOUT COMPLICATIONS: Chronic | Status: ACTIVE | Noted: 2021-03-19

## 2022-06-10 LAB
ALBUMIN SERPL ELPH-MCNC: 4.1 G/DL — SIGNIFICANT CHANGE UP (ref 3.3–5)
ALP SERPL-CCNC: 71 U/L — SIGNIFICANT CHANGE UP (ref 40–120)
ALT FLD-CCNC: 15 U/L — SIGNIFICANT CHANGE UP (ref 4–33)
ANION GAP SERPL CALC-SCNC: 13 MMOL/L — SIGNIFICANT CHANGE UP (ref 7–14)
AST SERPL-CCNC: 19 U/L — SIGNIFICANT CHANGE UP (ref 4–32)
BASOPHILS # BLD AUTO: 0.04 K/UL — SIGNIFICANT CHANGE UP (ref 0–0.2)
BASOPHILS NFR BLD AUTO: 0.6 % — SIGNIFICANT CHANGE UP (ref 0–2)
BILIRUB SERPL-MCNC: 0.2 MG/DL — SIGNIFICANT CHANGE UP (ref 0.2–1.2)
BUN SERPL-MCNC: 43 MG/DL — HIGH (ref 7–23)
CALCIUM SERPL-MCNC: 9.4 MG/DL — SIGNIFICANT CHANGE UP (ref 8.4–10.5)
CHLORIDE SERPL-SCNC: 101 MMOL/L — SIGNIFICANT CHANGE UP (ref 98–107)
CO2 SERPL-SCNC: 23 MMOL/L — SIGNIFICANT CHANGE UP (ref 22–31)
CREAT SERPL-MCNC: 1.6 MG/DL — HIGH (ref 0.5–1.3)
EGFR: 32 ML/MIN/1.73M2 — LOW
EOSINOPHIL # BLD AUTO: 0.11 K/UL — SIGNIFICANT CHANGE UP (ref 0–0.5)
EOSINOPHIL NFR BLD AUTO: 1.6 % — SIGNIFICANT CHANGE UP (ref 0–6)
GLUCOSE SERPL-MCNC: 199 MG/DL — HIGH (ref 70–99)
HCT VFR BLD CALC: 37.5 % — SIGNIFICANT CHANGE UP (ref 34.5–45)
HGB BLD-MCNC: 11.4 G/DL — LOW (ref 11.5–15.5)
IANC: 4.38 K/UL — SIGNIFICANT CHANGE UP (ref 1.8–7.4)
IMM GRANULOCYTES NFR BLD AUTO: 0.3 % — SIGNIFICANT CHANGE UP (ref 0–1.5)
LYMPHOCYTES # BLD AUTO: 1.58 K/UL — SIGNIFICANT CHANGE UP (ref 1–3.3)
LYMPHOCYTES # BLD AUTO: 23.3 % — SIGNIFICANT CHANGE UP (ref 13–44)
MAGNESIUM SERPL-MCNC: 2.1 MG/DL — SIGNIFICANT CHANGE UP (ref 1.6–2.6)
MCHC RBC-ENTMCNC: 29.3 PG — SIGNIFICANT CHANGE UP (ref 27–34)
MCHC RBC-ENTMCNC: 30.4 GM/DL — LOW (ref 32–36)
MCV RBC AUTO: 96.4 FL — SIGNIFICANT CHANGE UP (ref 80–100)
MONOCYTES # BLD AUTO: 0.65 K/UL — SIGNIFICANT CHANGE UP (ref 0–0.9)
MONOCYTES NFR BLD AUTO: 9.6 % — SIGNIFICANT CHANGE UP (ref 2–14)
NEUTROPHILS # BLD AUTO: 4.38 K/UL — SIGNIFICANT CHANGE UP (ref 1.8–7.4)
NEUTROPHILS NFR BLD AUTO: 64.6 % — SIGNIFICANT CHANGE UP (ref 43–77)
NRBC # BLD: 0 /100 WBCS — SIGNIFICANT CHANGE UP
NRBC # FLD: 0 K/UL — SIGNIFICANT CHANGE UP
PHOSPHATE SERPL-MCNC: 2.8 MG/DL — SIGNIFICANT CHANGE UP (ref 2.5–4.5)
PLATELET # BLD AUTO: 263 K/UL — SIGNIFICANT CHANGE UP (ref 150–400)
POTASSIUM SERPL-MCNC: 4.3 MMOL/L — SIGNIFICANT CHANGE UP (ref 3.5–5.3)
POTASSIUM SERPL-SCNC: 4.3 MMOL/L — SIGNIFICANT CHANGE UP (ref 3.5–5.3)
PROT SERPL-MCNC: 7.8 G/DL — SIGNIFICANT CHANGE UP (ref 6–8.3)
RBC # BLD: 3.89 M/UL — SIGNIFICANT CHANGE UP (ref 3.8–5.2)
RBC # FLD: 14.4 % — SIGNIFICANT CHANGE UP (ref 10.3–14.5)
SODIUM SERPL-SCNC: 137 MMOL/L — SIGNIFICANT CHANGE UP (ref 135–145)
WBC # BLD: 6.78 K/UL — SIGNIFICANT CHANGE UP (ref 3.8–10.5)
WBC # FLD AUTO: 6.78 K/UL — SIGNIFICANT CHANGE UP (ref 3.8–10.5)

## 2022-06-10 PROCEDURE — 99284 EMERGENCY DEPT VISIT MOD MDM: CPT

## 2022-06-10 RX ORDER — SODIUM CHLORIDE 9 MG/ML
500 INJECTION INTRAMUSCULAR; INTRAVENOUS; SUBCUTANEOUS ONCE
Refills: 0 | Status: DISCONTINUED | OUTPATIENT
Start: 2022-06-10 | End: 2022-06-10

## 2022-06-10 NOTE — ED PROVIDER NOTE - NSICDXFAMILYHX_GEN_ALL_CORE_FT
FAMILY HISTORY:  Family history of heart disease    Father  Still living? Unknown  Family history of coronary artery disease, Age at diagnosis: Age Unknown    Mother  Still living? Unknown  Family history of coronary artery disease, Age at diagnosis: Age Unknown

## 2022-06-10 NOTE — ED PROVIDER NOTE - OBJECTIVE STATEMENT
80 yo F hx HTN, DM, lung CA s/p radiation therapy 6 months ago, presenting with 1 month of worsening twitching. 1 month ago started to notice tingling in her toes as well as twitching, with no known trigger 80 yo F hx HTN, DM, lung CA s/p radiation therapy 6 months ago, presenting with 1 month of worsening twitching. 1 month ago started to notice tingling in her toes as well as twitching, with no known trigger. 82 yo F hx HTN, DM, lung CA s/p radiation therapy 6 months ago, asthma, presenting with 1 month of worsening twitching. 1 month ago started to notice tingling in her toes as well as twitching, with no known trigger. No hx seizures. No LOC, no recetn head injury or trauma. No recent medication or med changes.  No chest pain, SOB, nausea/vomiting, fever/chills.    NKDA 82 yo F hx HTN, DM, lung CA s/p radiation therapy 6 months ago, asthma, presenting with 1 month of worsening twitching. 1 month ago started to notice tingling in her toes as well as twitching, with no known trigger. No hx seizures. No LOC, no recent head injury or trauma. No recent medication or med changes.  No chest pain, SOB, nausea/vomiting, fever/chills.    NKDA

## 2022-06-10 NOTE — ED PROVIDER NOTE - NSFOLLOWUPINSTRUCTIONS_ED_ALL_ED_FT
Take all medication as directed.    You were found to have an elevated Creatinine 1.60. You need to follow up with a nephrologist, bring your paperwork with you to your appointment.     For pain or fever you can ibuprofen (motrin, advil) or tylenol as needed, as directed on packaging.     Follow up with your primary care doctor within 5 days as directed.     If you had labs or imaging done, you were given copies of all of the available results. If anything is pending to result or pending official read, you will receive a call if results are positive.    If needed, call patient access services at [phone] to find a primary care doctor, or call at [phone] to make an appointment at the clinic.    Return to the ER for any worsening symptoms or concerns, including chest pain, shortness of breath, fever, chills.

## 2022-06-10 NOTE — ED ADULT NURSE NOTE - CHIEF COMPLAINT QUOTE
pt noticed over the past 2 days has had b/l hand twitching and noticed that she has decreased strength. pt states not able to grap cups dropping them  . pt also reprots b/l feet twitching  and left shoulder jerking x few weeks. pt awake and alert x 3 in triage. fis 170 in triage.

## 2022-06-10 NOTE — ED PROVIDER NOTE - CLINICAL SUMMARY MEDICAL DECISION MAKING FREE TEXT BOX
Andriy CAST PGY-2: 80 yo F hx HTN, DM, lung CA, asthma, presenting with 1 month of hand/feet twitching. No head injury/trauma, no use of AC, no LOC. Low suspicion for sz/ICH. Exam non-focal, sensation normal, strength 5/5. Patient able to ambulate. Will obtain labs to eval electrolyte abnormality. If WNL, anticipate discharge with neuro follow up.

## 2022-06-10 NOTE — ED PROVIDER NOTE - NSFOLLOWUPCLINICS_GEN_ALL_ED_FT
[facility]  Neurology  [address] [place] [address] [address] [place] [address]  Phone: [phone]  Fax:

## 2022-06-10 NOTE — ED PROVIDER NOTE - ENMT NEGATIVE STATEMENT, MLM
show Ears: no ear pain and no hearing problems. Nose: no nasal congestion and no nasal drainage. Mouth/Throat: no dysphagia, no hoarseness and no throat pain. Neck: no lumps, no pain, no stiffness and no swollen glands.

## 2022-06-10 NOTE — ED PROVIDER NOTE - PATIENT PORTAL LINK FT
You can access the FollowMyHealth Patient Portal offered by [facility] by registering at the following website: [email or web address]. By joining [employer]’s FollowMyHealth portal, you will also be able to view your health information using other applications (apps) compatible with our system.

## 2022-06-10 NOTE — ED PROVIDER NOTE - ATTENDING CONTRIBUTION TO CARE
Pt was seen and evaluated by me. Pt is a 80 y/o female with PMHx of HTN, DM type 2, lung CA s/p radiation therapy, and asthma who presented to the ED for intermittent twitching to toes and fingers X 1 month. Pt states over the past month having episodes of twitching to toes and sometimes fingers. Noted mostly when she is lying down. Pt does not she did drop an item but denies any headache, neck pain, weakness, fever, chills, nausea, vomiting, SOB, chest pain, or abd pain.  VITALS: Vitals have been reviewed.  GEN APPEARANCE: WDWN, alert and cooperative, non-toxic appearing and in NAD  HEAD: Atraumatic, normocephalic.   EYES: PERRL, EOMI.   EARS: Gross hearing intact.   NOSE: No nasal discharge.   THROAT: MMM.   NECK: Supple, no lymphadenopathy  CV: RRR, S1S2, no c/r/m/g. No cyanosis or pallor. Extremities warm, well perfused. Cap refill <2 seconds. No bruits.   LUNGS: CTAB. No wheezing. No rales. No rhonchi. No diminished breath sounds.   ABDOMEN: Soft, NTND. No guarding or rebound.   MSK/EXT: Spine appears normal, no spine point tenderness. No CVA ttp. Normal muscular development. PELVIS: Stable. No obvious joint or bony deformity, no peripheral edema.   NEURO: Alert, follows commands. Speech normal. Sensation and motor normal x4 extremities. 5/5 b/l UE and LE. No current twitching noted.  SKIN: Normal color for race, warm, dry and intact. No evidence of rash.  PSYCH: Normal mood and affect.   80 y/o female with PMHx of HTN, DM type 2, lung CA s/p radiation therapy, and asthma who presented to the ED for intermittent twitching to toes and fingers X 1 month.   Concern for electrolyte abnormality, neuro dysfunction, neuropathy  Labs, EKG, IVF

## 2022-06-10 NOTE — ED PROVIDER NOTE - NSICDXPASTSURGICALHX_GEN_ALL_CORE_FT
PAST SURGICAL HISTORY:  H/O:  section     History of uterine prolapse Tx surgically    S/P coronary angiogram no stent.  50% stenosis mid RCA [facility] otherwise no stenosis    S/P lobectomy of lung     S/P thoracotomy Left ; unsure regarding exact procedure    S/P total abdominal hysterectomy

## 2022-06-10 NOTE — ED PROVIDER NOTE - NSICDXPASTMEDICALHX_GEN_ALL_CORE_FT
PAST MEDICAL HISTORY:  Asthma     CAD (coronary artery disease) cardiac stent ; 3/18 per pt    Coronary arteriosclerosis     Diabetes     Diabetes mellitus     Former smoker Quit ~2008    GERD (gastroesophageal reflux disease)     Gout     Hypercholesterolemia     Hyperlipidemia     Hypertension     Hypertension     Lung cancer (s/p left lobectomy)    Lung cancer tx surgically ; pt denies chemo , denies rt    Peripheral arterial disease     Valvular heart disease

## 2022-06-10 NOTE — ED PROVIDER NOTE - PROGRESS NOTE DETAILS
Andriy CAST PGY-2: Pt was re-evaluated at bedside, VSS, feeling well overall. Return precautions and follow up plan with PCP and/or specialist were discussed. Time was taken to answer any questions that the patient had before providing them with discharge paperwork. Andriy CAST PGY-2: patient noted to have KEVIN with Cr 1.60. Had discussion with patient and patient's daughter at bedside to see if patient would be willing to stay in [facility]U for IVF and nephrology evaluation; both fernanda and vega prefer to follow up outpatient. Spoke with discharge lounge who will assist fernanda in scheduling nephrology appointment. Will provide referral information for both nephrology and neurology outpatient follow up.

## 2022-06-10 NOTE — ED ADULT NURSE NOTE - ALCOHOL PRE SCREEN (AUDIT - C)
----- Message from Martina Marshall sent at 7/31/2018  9:28 AM CDT -----  Contact: Patient   Needs Advice    Reason for call:  Questions about a medication     Communication Preference: [phone]  Additional Information: n/a   Statement Selected

## 2022-06-10 NOTE — ED ADULT NURSE NOTE - OBJECTIVE STATEMENT
Patient received to room 4.(ginger rn. give report to monique). Patient is a&ox4 ambulatory at baseline. Patient complaining of  left food twitching started a month ago. Patient has DM, HTN, LUNGCA, last rad ition is 6 months ago. Patient denies chest pain sob n/v. Patient able to ambulate, ambulated to bathroom. Patient has a right 20g. Patient awaiting for rsults.

## 2022-06-30 NOTE — ED ADULT TRIAGE NOTE - NS ED TRIAGE EKG
Detail Level: Simple
Instructions: This plan will send the code FBSE to the PM system.  DO NOT or CHANGE the price.
EKG completed
Price (Do Not Change): 0.00

## 2022-07-25 ENCOUNTER — INPATIENT (INPATIENT)
Facility: HOSPITAL | Age: 82
LOS: 7 days | Discharge: HOME | End: 2022-08-02
Attending: INTERNAL MEDICINE | Admitting: INTERNAL MEDICINE

## 2022-07-25 VITALS
DIASTOLIC BLOOD PRESSURE: 73 MMHG | TEMPERATURE: 98 F | HEART RATE: 111 BPM | RESPIRATION RATE: 18 BRPM | OXYGEN SATURATION: 100 % | SYSTOLIC BLOOD PRESSURE: 149 MMHG | HEIGHT: 63 IN

## 2022-07-25 DIAGNOSIS — I10 ESSENTIAL (PRIMARY) HYPERTENSION: ICD-10-CM

## 2022-07-25 DIAGNOSIS — A41.9 SEPSIS, UNSPECIFIED ORGANISM: ICD-10-CM

## 2022-07-25 DIAGNOSIS — Z98.890 OTHER SPECIFIED POSTPROCEDURAL STATES: Chronic | ICD-10-CM

## 2022-07-25 DIAGNOSIS — J18.9 PNEUMONIA, UNSPECIFIED ORGANISM: ICD-10-CM

## 2022-07-25 DIAGNOSIS — Z98.89 OTHER SPECIFIED POSTPROCEDURAL STATES: Chronic | ICD-10-CM

## 2022-07-25 DIAGNOSIS — Z87.42 PERSONAL HISTORY OF OTHER DISEASES OF THE FEMALE GENITAL TRACT: Chronic | ICD-10-CM

## 2022-07-25 DIAGNOSIS — Z29.9 ENCOUNTER FOR PROPHYLACTIC MEASURES, UNSPECIFIED: ICD-10-CM

## 2022-07-25 DIAGNOSIS — Z79.899 OTHER LONG TERM (CURRENT) DRUG THERAPY: ICD-10-CM

## 2022-07-25 DIAGNOSIS — E11.9 TYPE 2 DIABETES MELLITUS WITHOUT COMPLICATIONS: ICD-10-CM

## 2022-07-25 DIAGNOSIS — Z90.710 ACQUIRED ABSENCE OF BOTH CERVIX AND UTERUS: Chronic | ICD-10-CM

## 2022-07-25 DIAGNOSIS — Z90.2 ACQUIRED ABSENCE OF LUNG [PART OF]: Chronic | ICD-10-CM

## 2022-07-25 LAB
ALBUMIN SERPL ELPH-MCNC: 2.7 G/DL — LOW (ref 3.3–5)
ALP SERPL-CCNC: 77 U/L — SIGNIFICANT CHANGE UP (ref 40–120)
ALT FLD-CCNC: 29 U/L — SIGNIFICANT CHANGE UP (ref 4–33)
ANION GAP SERPL CALC-SCNC: 13 MMOL/L — SIGNIFICANT CHANGE UP (ref 7–14)
APPEARANCE UR: CLEAR — SIGNIFICANT CHANGE UP
AST SERPL-CCNC: 21 U/L — SIGNIFICANT CHANGE UP (ref 4–32)
B PERT DNA SPEC QL NAA+PROBE: SIGNIFICANT CHANGE UP
B PERT+PARAPERT DNA PNL SPEC NAA+PROBE: SIGNIFICANT CHANGE UP
BASOPHILS # BLD AUTO: 0.02 K/UL — SIGNIFICANT CHANGE UP (ref 0–0.2)
BASOPHILS NFR BLD AUTO: 0.1 % — SIGNIFICANT CHANGE UP (ref 0–2)
BILIRUB SERPL-MCNC: 0.4 MG/DL — SIGNIFICANT CHANGE UP (ref 0.2–1.2)
BILIRUB UR-MCNC: NEGATIVE — SIGNIFICANT CHANGE UP
BORDETELLA PARAPERTUSSIS (RAPRVP): SIGNIFICANT CHANGE UP
BUN SERPL-MCNC: 17 MG/DL — SIGNIFICANT CHANGE UP (ref 7–23)
C PNEUM DNA SPEC QL NAA+PROBE: SIGNIFICANT CHANGE UP
CALCIUM SERPL-MCNC: 9.4 MG/DL — SIGNIFICANT CHANGE UP (ref 8.4–10.5)
CHLORIDE SERPL-SCNC: 91 MMOL/L — LOW (ref 98–107)
CO2 SERPL-SCNC: 29 MMOL/L — SIGNIFICANT CHANGE UP (ref 22–31)
COLOR SPEC: SIGNIFICANT CHANGE UP
CREAT SERPL-MCNC: 0.91 MG/DL — SIGNIFICANT CHANGE UP (ref 0.5–1.3)
DIFF PNL FLD: NEGATIVE — SIGNIFICANT CHANGE UP
EGFR: 63 ML/MIN/1.73M2 — SIGNIFICANT CHANGE UP
EOSINOPHIL # BLD AUTO: 0.01 K/UL — SIGNIFICANT CHANGE UP (ref 0–0.5)
EOSINOPHIL NFR BLD AUTO: 0.1 % — SIGNIFICANT CHANGE UP (ref 0–6)
FLUAV SUBTYP SPEC NAA+PROBE: SIGNIFICANT CHANGE UP
FLUBV RNA SPEC QL NAA+PROBE: SIGNIFICANT CHANGE UP
GAS PNL BLDV: SIGNIFICANT CHANGE UP
GLUCOSE BLDC GLUCOMTR-MCNC: 237 MG/DL — HIGH (ref 70–99)
GLUCOSE BLDC GLUCOMTR-MCNC: 255 MG/DL — HIGH (ref 70–99)
GLUCOSE SERPL-MCNC: 283 MG/DL — HIGH (ref 70–99)
GLUCOSE UR QL: ABNORMAL
HADV DNA SPEC QL NAA+PROBE: SIGNIFICANT CHANGE UP
HCOV 229E RNA SPEC QL NAA+PROBE: SIGNIFICANT CHANGE UP
HCOV HKU1 RNA SPEC QL NAA+PROBE: SIGNIFICANT CHANGE UP
HCOV NL63 RNA SPEC QL NAA+PROBE: SIGNIFICANT CHANGE UP
HCOV OC43 RNA SPEC QL NAA+PROBE: SIGNIFICANT CHANGE UP
HCT VFR BLD CALC: 38.5 % — SIGNIFICANT CHANGE UP (ref 34.5–45)
HGB BLD-MCNC: 12.3 G/DL — SIGNIFICANT CHANGE UP (ref 11.5–15.5)
HMPV RNA SPEC QL NAA+PROBE: SIGNIFICANT CHANGE UP
HPIV1 RNA SPEC QL NAA+PROBE: SIGNIFICANT CHANGE UP
HPIV2 RNA SPEC QL NAA+PROBE: SIGNIFICANT CHANGE UP
HPIV3 RNA SPEC QL NAA+PROBE: SIGNIFICANT CHANGE UP
HPIV4 RNA SPEC QL NAA+PROBE: SIGNIFICANT CHANGE UP
IANC: 11.74 K/UL — HIGH (ref 1.8–7.4)
IMM GRANULOCYTES NFR BLD AUTO: 0.7 % — SIGNIFICANT CHANGE UP (ref 0–1.5)
KETONES UR-MCNC: ABNORMAL
LEUKOCYTE ESTERASE UR-ACNC: NEGATIVE — SIGNIFICANT CHANGE UP
LIDOCAIN IGE QN: 110 U/L — HIGH (ref 7–60)
LYMPHOCYTES # BLD AUTO: 1.13 K/UL — SIGNIFICANT CHANGE UP (ref 1–3.3)
LYMPHOCYTES # BLD AUTO: 8 % — LOW (ref 13–44)
M PNEUMO DNA SPEC QL NAA+PROBE: SIGNIFICANT CHANGE UP
MCHC RBC-ENTMCNC: 28.9 PG — SIGNIFICANT CHANGE UP (ref 27–34)
MCHC RBC-ENTMCNC: 31.9 GM/DL — LOW (ref 32–36)
MCV RBC AUTO: 90.4 FL — SIGNIFICANT CHANGE UP (ref 80–100)
MONOCYTES # BLD AUTO: 1.12 K/UL — HIGH (ref 0–0.9)
MONOCYTES NFR BLD AUTO: 7.9 % — SIGNIFICANT CHANGE UP (ref 2–14)
NEUTROPHILS # BLD AUTO: 11.74 K/UL — HIGH (ref 1.8–7.4)
NEUTROPHILS NFR BLD AUTO: 83.2 % — HIGH (ref 43–77)
NITRITE UR-MCNC: NEGATIVE — SIGNIFICANT CHANGE UP
NRBC # BLD: 0 /100 WBCS — SIGNIFICANT CHANGE UP
NRBC # FLD: 0 K/UL — SIGNIFICANT CHANGE UP
PH UR: 6.5 — SIGNIFICANT CHANGE UP (ref 5–8)
PLATELET # BLD AUTO: 510 K/UL — HIGH (ref 150–400)
POTASSIUM SERPL-MCNC: 4.1 MMOL/L — SIGNIFICANT CHANGE UP (ref 3.5–5.3)
POTASSIUM SERPL-SCNC: 4.1 MMOL/L — SIGNIFICANT CHANGE UP (ref 3.5–5.3)
PROT SERPL-MCNC: 7.7 G/DL — SIGNIFICANT CHANGE UP (ref 6–8.3)
PROT UR-MCNC: ABNORMAL
RAPID RVP RESULT: SIGNIFICANT CHANGE UP
RBC # BLD: 4.26 M/UL — SIGNIFICANT CHANGE UP (ref 3.8–5.2)
RBC # FLD: 14.9 % — HIGH (ref 10.3–14.5)
RSV RNA SPEC QL NAA+PROBE: SIGNIFICANT CHANGE UP
RV+EV RNA SPEC QL NAA+PROBE: SIGNIFICANT CHANGE UP
SARS-COV-2 RNA SPEC QL NAA+PROBE: SIGNIFICANT CHANGE UP
SODIUM SERPL-SCNC: 133 MMOL/L — LOW (ref 135–145)
SP GR SPEC: 1.01 — SIGNIFICANT CHANGE UP (ref 1–1.05)
TROPONIN T, HIGH SENSITIVITY RESULT: 23 NG/L — SIGNIFICANT CHANGE UP
UROBILINOGEN FLD QL: SIGNIFICANT CHANGE UP
WBC # BLD: 14.12 K/UL — HIGH (ref 3.8–10.5)
WBC # FLD AUTO: 14.12 K/UL — HIGH (ref 3.8–10.5)

## 2022-07-25 PROCEDURE — 71275 CT ANGIOGRAPHY CHEST: CPT | Mod: 26,MA

## 2022-07-25 PROCEDURE — 99223 1ST HOSP IP/OBS HIGH 75: CPT

## 2022-07-25 PROCEDURE — 99285 EMERGENCY DEPT VISIT HI MDM: CPT

## 2022-07-25 PROCEDURE — 74177 CT ABD & PELVIS W/CONTRAST: CPT | Mod: 26,MA

## 2022-07-25 RX ORDER — PIPERACILLIN AND TAZOBACTAM 4; .5 G/20ML; G/20ML
3.38 INJECTION, POWDER, LYOPHILIZED, FOR SOLUTION INTRAVENOUS ONCE
Refills: 0 | Status: COMPLETED | OUTPATIENT
Start: 2022-07-25 | End: 2022-07-25

## 2022-07-25 RX ORDER — NIFEDIPINE 30 MG
30 TABLET, EXTENDED RELEASE 24 HR ORAL DAILY
Refills: 0 | Status: DISCONTINUED | OUTPATIENT
Start: 2022-07-25 | End: 2022-08-02

## 2022-07-25 RX ORDER — ALBUTEROL 90 UG/1
2 AEROSOL, METERED ORAL EVERY 6 HOURS
Refills: 0 | Status: DISCONTINUED | OUTPATIENT
Start: 2022-07-25 | End: 2022-08-02

## 2022-07-25 RX ORDER — CHLORHEXIDINE GLUCONATE 213 G/1000ML
1 SOLUTION TOPICAL DAILY
Refills: 0 | Status: DISCONTINUED | OUTPATIENT
Start: 2022-07-25 | End: 2022-08-02

## 2022-07-25 RX ORDER — PANTOPRAZOLE SODIUM 20 MG/1
40 TABLET, DELAYED RELEASE ORAL
Refills: 0 | Status: DISCONTINUED | OUTPATIENT
Start: 2022-07-25 | End: 2022-08-02

## 2022-07-25 RX ORDER — DEXTROSE 50 % IN WATER 50 %
25 SYRINGE (ML) INTRAVENOUS ONCE
Refills: 0 | Status: DISCONTINUED | OUTPATIENT
Start: 2022-07-25 | End: 2022-08-02

## 2022-07-25 RX ORDER — HYDROCHLOROTHIAZIDE 25 MG
25 TABLET ORAL DAILY
Refills: 0 | Status: DISCONTINUED | OUTPATIENT
Start: 2022-07-25 | End: 2022-08-02

## 2022-07-25 RX ORDER — VALSARTAN 80 MG/1
320 TABLET ORAL DAILY
Refills: 0 | Status: DISCONTINUED | OUTPATIENT
Start: 2022-07-25 | End: 2022-08-02

## 2022-07-25 RX ORDER — DEXTROSE 50 % IN WATER 50 %
15 SYRINGE (ML) INTRAVENOUS ONCE
Refills: 0 | Status: DISCONTINUED | OUTPATIENT
Start: 2022-07-25 | End: 2022-08-02

## 2022-07-25 RX ORDER — INSULIN LISPRO 100/ML
VIAL (ML) SUBCUTANEOUS AT BEDTIME
Refills: 0 | Status: DISCONTINUED | OUTPATIENT
Start: 2022-07-25 | End: 2022-08-02

## 2022-07-25 RX ORDER — CEFTRIAXONE 500 MG/1
1000 INJECTION, POWDER, FOR SOLUTION INTRAMUSCULAR; INTRAVENOUS ONCE
Refills: 0 | Status: COMPLETED | OUTPATIENT
Start: 2022-07-25 | End: 2022-07-25

## 2022-07-25 RX ORDER — GLUCAGON INJECTION, SOLUTION 0.5 MG/.1ML
1 INJECTION, SOLUTION SUBCUTANEOUS ONCE
Refills: 0 | Status: DISCONTINUED | OUTPATIENT
Start: 2022-07-25 | End: 2022-08-02

## 2022-07-25 RX ORDER — PIPERACILLIN AND TAZOBACTAM 4; .5 G/20ML; G/20ML
3.38 INJECTION, POWDER, LYOPHILIZED, FOR SOLUTION INTRAVENOUS EVERY 8 HOURS
Refills: 0 | Status: COMPLETED | OUTPATIENT
Start: 2022-07-26 | End: 2022-08-01

## 2022-07-25 RX ORDER — AZITHROMYCIN 500 MG/1
500 TABLET, FILM COATED ORAL ONCE
Refills: 0 | Status: COMPLETED | OUTPATIENT
Start: 2022-07-25 | End: 2022-07-25

## 2022-07-25 RX ORDER — VANCOMYCIN HCL 1 G
1000 VIAL (EA) INTRAVENOUS EVERY 24 HOURS
Refills: 0 | Status: DISCONTINUED | OUTPATIENT
Start: 2022-07-25 | End: 2022-07-26

## 2022-07-25 RX ORDER — HEPARIN SODIUM 5000 [USP'U]/ML
5000 INJECTION INTRAVENOUS; SUBCUTANEOUS EVERY 12 HOURS
Refills: 0 | Status: DISCONTINUED | OUTPATIENT
Start: 2022-07-25 | End: 2022-08-02

## 2022-07-25 RX ORDER — AZITHROMYCIN 500 MG/1
500 TABLET, FILM COATED ORAL EVERY 24 HOURS
Refills: 0 | Status: DISCONTINUED | OUTPATIENT
Start: 2022-07-26 | End: 2022-07-27

## 2022-07-25 RX ORDER — DEXTROSE 50 % IN WATER 50 %
12.5 SYRINGE (ML) INTRAVENOUS ONCE
Refills: 0 | Status: DISCONTINUED | OUTPATIENT
Start: 2022-07-25 | End: 2022-08-02

## 2022-07-25 RX ORDER — SODIUM CHLORIDE 9 MG/ML
1000 INJECTION INTRAMUSCULAR; INTRAVENOUS; SUBCUTANEOUS ONCE
Refills: 0 | Status: COMPLETED | OUTPATIENT
Start: 2022-07-25 | End: 2022-07-25

## 2022-07-25 RX ORDER — BUDESONIDE AND FORMOTEROL FUMARATE DIHYDRATE 160; 4.5 UG/1; UG/1
2 AEROSOL RESPIRATORY (INHALATION)
Refills: 0 | Status: DISCONTINUED | OUTPATIENT
Start: 2022-07-25 | End: 2022-08-02

## 2022-07-25 RX ORDER — INSULIN LISPRO 100/ML
VIAL (ML) SUBCUTANEOUS
Refills: 0 | Status: DISCONTINUED | OUTPATIENT
Start: 2022-07-25 | End: 2022-08-02

## 2022-07-25 RX ORDER — SODIUM CHLORIDE 9 MG/ML
1000 INJECTION, SOLUTION INTRAVENOUS
Refills: 0 | Status: DISCONTINUED | OUTPATIENT
Start: 2022-07-25 | End: 2022-08-02

## 2022-07-25 RX ORDER — AZITHROMYCIN 500 MG/1
TABLET, FILM COATED ORAL
Refills: 0 | Status: DISCONTINUED | OUTPATIENT
Start: 2022-07-25 | End: 2022-07-27

## 2022-07-25 RX ORDER — METOPROLOL TARTRATE 50 MG
50 TABLET ORAL DAILY
Refills: 0 | Status: DISCONTINUED | OUTPATIENT
Start: 2022-07-25 | End: 2022-08-02

## 2022-07-25 RX ORDER — TIOTROPIUM BROMIDE 18 UG/1
1 CAPSULE ORAL; RESPIRATORY (INHALATION) DAILY
Refills: 0 | Status: DISCONTINUED | OUTPATIENT
Start: 2022-07-25 | End: 2022-08-02

## 2022-07-25 RX ADMIN — SODIUM CHLORIDE 1000 MILLILITER(S): 9 INJECTION INTRAMUSCULAR; INTRAVENOUS; SUBCUTANEOUS at 13:30

## 2022-07-25 RX ADMIN — CEFTRIAXONE 100 MILLIGRAM(S): 500 INJECTION, POWDER, FOR SOLUTION INTRAMUSCULAR; INTRAVENOUS at 20:10

## 2022-07-25 RX ADMIN — PIPERACILLIN AND TAZOBACTAM 200 GRAM(S): 4; .5 INJECTION, POWDER, LYOPHILIZED, FOR SOLUTION INTRAVENOUS at 23:32

## 2022-07-25 NOTE — PATIENT PROFILE ADULT - FALL HARM RISK - HARM RISK INTERVENTIONS
Assistance with ambulation/Assistance OOB with selected safe patient handling equipment/Communicate Risk of Fall with Harm to all staff/Discuss with provider need for PT consult/Monitor gait and stability/Provide patient with walking aids - walker, cane, crutches/Reinforce activity limits and safety measures with patient and family/Tailored Fall Risk Interventions/Use of alarms - bed, chair and/or voice tab/Visual Cue: Yellow wristband and red socks/Bed in lowest position, wheels locked, appropriate side rails in place/Call bell, personal items and telephone in reach/Instruct patient to call for assistance before getting out of bed or chair/Non-slip footwear when patient is out of bed/[place] to call system/Physically safe environment - no spills, clutter or unnecessary equipment/Purposeful Proactive Rounding/Room/bathroom lighting operational, light cord in reach

## 2022-07-25 NOTE — H&P ADULT - PROBLEM/PLAN-5
Detail Level: Zone DISPLAY PLAN FREE TEXT Detail Level: Detailed Detail Level: Generalized Detail Level: Simple

## 2022-07-25 NOTE — H&P ADULT - PROBLEM SELECTOR PLAN 6
grand-daughter has med list without dosing frequency, also  unclear if represents complete list. will place on some meds, hold med rec pending pharmacy input; pharmacy emailed

## 2022-07-25 NOTE — ED ADULT TRIAGE NOTE - CHIEF COMPLAINT QUOTE
pt c/o diarrhea and no appetite x 4 days. c/o generalized weakness. reports subjective fevers. denies abd pain/n/v.

## 2022-07-25 NOTE — PATIENT PROFILE ADULT - STATED REASON FOR ADMISSION
Pt. has been coughing, loss of appetite, fever, and fatigue. Pt. daughter took her to urgent care & was referred to the hospital.

## 2022-07-25 NOTE — ED PROVIDER NOTE - CLINICAL SUMMARY MEDICAL DECISION MAKING FREE TEXT BOX
81 you F hx lung CA sp lobectomy L and recent recurrence sp radiation, no chemo, DM on orals, HTN, HLD, CAD, GERD, Gout, pw diarrhea, intermittent fever, tmax, 100.1 orally, cough, and chest tightness and progressive weakness since 7/8. + diminished L breath sounds. ddx: colitis, viral syndrome, COVID, PNA, PE, electrolyte disturbance. Reassess for disposition.

## 2022-07-25 NOTE — H&P ADULT - PROBLEM SELECTOR PLAN 1
-2/2 multilobar pna  -will cover broadly, deescalate as appropriate. follow cultures  -c/w laba/ics, spiriva, prn albuterol   -reports diarrhea for last few days but reports only after po intake.  Granddaughter reports not unusually foul smelling. Denies abx use over last few months. low suspicion for cdiff

## 2022-07-25 NOTE — ED PROVIDER NOTE - OBJECTIVE STATEMENT
81 you F hx lung CA sp lobectomy L and recent recurrence sp radiation, no chemo, DM on orals, HTN, HLD, CAD, GERD, Gout, pw diarrhea, intermittent fever, tmax, 100.1 orally, cough, and chest tightness and progressive weakness since 7/8. Pt was visiting her son in [facility] and just returned yesterday. No recent abx, no foreign travel.

## 2022-07-25 NOTE — ED PROVIDER NOTE - PHYSICAL EXAMINATION
GEN: Patient awake alert NAD.   HEENT: normocephalic, atraumatic, EOMI, no scleral icterus.   CARDIAC: RRR, S1, S2, no murmur.   PULM: + diminished L breath sounds, no wheeze, rhonchi, rales.   ABD: soft NT, ND, no rebound no guarding.   MSK: Moving all extremities, no edema. 5/5 strength and full ROM in all extremities.     NEURO: A&Ox3, no focal neurological deficits, CN 2-12 grossly intact  SKIN: warm, dry, no rash.

## 2022-07-25 NOTE — H&P ADULT - NSHPREVIEWOFSYSTEMS_GEN_ALL_CORE
Review of Systems:   CONSTITUTIONAL: No fever, weight loss; + fatigue  EYES: No eye pain, visual disturbances, or discharge  ENMT:  No difficulty hearing, tinnitus, vertigo; No sinus or throat pain  NECK: No pain or stiffness  RESPIRATORY: + cough, no  chills or hemoptysis; No shortness of breath; +pleurisy  CARDIOVASCULAR:  no palpitations, dizziness, or leg swelling  GASTROINTESTINAL: No abdominal or epigastric pain. No nausea, vomiting, or hematemesis; + diarrhea no constipation. No melena or hematochezia.  GENITOURINARY: No dysuria, frequency, hematuria, or incontinence  NEUROLOGICAL: No headaches, , or tremors  SKIN: No itching, burning, rashes, or lesions   MUSCULOSKELETAL: No joint pain or swelling; No muscle, back, or extremity pain

## 2022-07-25 NOTE — ED PROVIDER NOTE - ATTENDING CONTRIBUTION TO CARE
Dr. Yun:  I have personally performed a face to face bedside history and physical examination of this patient. I have discussed the history, examination, review of systems, assessment and plan of management with the resident. I have reviewed the electronic medical record and amended it to reflect my history, review of systems, physical exam, assessment and plan.    81F h/o lung cancer s/p lobectomy and recent recurrence s/p radiation, DM, HTN, CAD, presents with diarrhea x 3-4 days, generalized weakness.  Also reports low grade fever (T 100.1) and cough.  Just returned from [facility].    Exam:  - nad  - rrr  - ctab   -abd soft ntnd    A/P  - eval infection, abd pathology, r/o PE  - basic labs, CTA chest, CT abd/pelvis

## 2022-07-25 NOTE — H&P ADULT - NSHPLABSRESULTS_GEN_ALL_CORE
12.3   14.12 )-----------( 510      ( 2022 13:30 )             38.5     07-25    133<L>  |  91<L>  |  17  ----------------------------<  283<H>  4.1   |  29  |  0.91    Ca    9.4      2022 13:30    TPro  7.7  /  Alb  2.7<L>  /  TBili  0.4  /  DBili  x   /  AST  21  /  ALT  29  /  AlkPhos  77  07-25    CAPILLARY BLOOD GLUCOSE      POCT Blood Glucose.: 255 mg/dL (2022 20:43)      Urinalysis Basic - ( 2022 15:00 )    Color: Light Yellow / Appearance: Clear / S.011 / pH: x  Gluc: x / Ketone: Trace  / Bili: Negative / Urobili: <2 mg/dL   Blood: x / Protein: 30 mg/dL / Nitrite: Negative   Leuk Esterase: Negative / RBC: 4 /HPF / WBC 2 /HPF   Sq Epi: x / Non Sq Epi: 2 /HPF / Bacteria: Negative      Vital Signs Last 24 Hrs  T(C): 36.9 (2022 16:05), Max: 36.9 (2022 16:05)  T(F): 98.5 (2022 16:05), Max: 98.5 (2022 16:05)  HR: 108 (2022 16:05) (108 - 111)  BP: 164/68 (2022 16:05) (149/73 - 179/81)  BP(mean): 108 (2022 14:56) (108 - 108)  RR: 18 (2022 16:05) (17 - 18)  SpO2: 100% (2022 16:05) (100% - 100%)    Parameters below as of 2022 16:05  Patient On (Oxygen Delivery Method): room air

## 2022-07-25 NOTE — H&P ADULT - NSHPPHYSICALEXAM_GEN_ALL_CORE
PHYSICAL EXAM:      Constitutional: NAD, well-groomed, well-developed  HEENT:  EOMI, Normal Hearing  Neck: No LAD, No JVD  Back: Normal spine flexure, No CVA tenderness  Respiratory: mild coarse breath sounds diffusely  Cardiovascular: S1 and S2, RRR, no M/G/R  Gastrointestinal: BS+, soft, NT/ND  Extremities: No peripheral edema  Vascular: 2+ peripheral pulses  Neurological: A/O x 2, no focal deficits  Psychiatric: Normal mood, normal affect  Musculoskeletal: 4-5/5 strength b/l upper and lower extremities  Skin: No rashes

## 2022-07-25 NOTE — H&P ADULT - HISTORY OF PRESENT ILLNESS
82 yo f h/o reactive airway disease not on home O2, lung ca s/p lobectomy, last radiation treatment per granddaughter at bedside June 3rd, DM2, HTN, glaucoma, CAD. Pt with increasing generalized weakness associated with cough that began 2 weeks ago.  Associated pleurisy. CT chest + for BL multi lobar PNA, negative for PE, but unable to fully evaluate subsegmental aa. Recently started on farxiga, UA negative for inf. PT doers reports diarrhea for last few days but reports only after po intake.  Granddaughter reports not unusually foul smelling. Denies abx use over last few months  82 yo f h/o reactive airway disease not on home O2, lung ca s/p lobectomy, last radiation treatment per granddaughter at bedside June 3rd, DM2, HTN, glaucoma, CAD. Pt with increasing generalized weakness associated with cough that began 2 weeks ago.  Associated pleurisy. CT chest + for BL multi lobar PNA, negative for PE, but unable to fully evaluate subsegmental aa. Recently started on farxiga, UA negative for inf. PT does reports diarrhea for last few days but reports only after po intake.  Granddaughter reports not unusually foul smelling. Denies abx use over last few months

## 2022-07-25 NOTE — ED PROVIDER NOTE - NS ED ROS FT
GENERAL: + fever, no chills + gen weakness.   EYES: no vision changes, no discharge.   ENT: no difficulty swallowing or speaking   CARDIAC: + chest tightness, +SOB, no lower extremity swelling  PULMONARY: + cough, +SOB  GI: no abdominal pain, n/v, + d  : no dysuria, no hematuria  SKIN: no rashes, no ecchymosis  NEURO: no headache, seizures  MSK: No joint pain, myalgia, weakness.

## 2022-07-25 NOTE — ED ADULT NURSE NOTE - OBJECTIVE STATEMENT
Pt is alert and oriented. Pt states that she has had no appetite and weakness since July 8th. Pt also has had a productive cough with green sputum since the beginning of July. Pt denies sob, chest pain, nausea, vomiting, dizziness and pain. Pt resp are even and unlabored, skin color heriberto for race. Pt updated on plan of care.

## 2022-07-26 LAB
A1C WITH ESTIMATED AVERAGE GLUCOSE RESULT: 8.2 % — HIGH (ref 4–5.6)
ALBUMIN SERPL ELPH-MCNC: 2.6 G/DL — LOW (ref 3.3–5)
ALP SERPL-CCNC: 75 U/L — SIGNIFICANT CHANGE UP (ref 40–120)
ALT FLD-CCNC: 25 U/L — SIGNIFICANT CHANGE UP (ref 4–33)
ANION GAP SERPL CALC-SCNC: 10 MMOL/L — SIGNIFICANT CHANGE UP (ref 7–14)
APTT BLD: 25.3 SEC — LOW (ref 27–36.3)
AST SERPL-CCNC: 17 U/L — SIGNIFICANT CHANGE UP (ref 4–32)
BASOPHILS # BLD AUTO: 0.02 K/UL — SIGNIFICANT CHANGE UP (ref 0–0.2)
BASOPHILS NFR BLD AUTO: 0.2 % — SIGNIFICANT CHANGE UP (ref 0–2)
BILIRUB SERPL-MCNC: 0.3 MG/DL — SIGNIFICANT CHANGE UP (ref 0.2–1.2)
BUN SERPL-MCNC: 14 MG/DL — SIGNIFICANT CHANGE UP (ref 7–23)
CALCIUM SERPL-MCNC: 8.7 MG/DL — SIGNIFICANT CHANGE UP (ref 8.4–10.5)
CHLORIDE SERPL-SCNC: 92 MMOL/L — LOW (ref 98–107)
CO2 SERPL-SCNC: 28 MMOL/L — SIGNIFICANT CHANGE UP (ref 22–31)
CREAT SERPL-MCNC: 0.85 MG/DL — SIGNIFICANT CHANGE UP (ref 0.5–1.3)
CULTURE RESULTS: SIGNIFICANT CHANGE UP
EGFR: 68 ML/MIN/1.73M2 — SIGNIFICANT CHANGE UP
EOSINOPHIL # BLD AUTO: 0.05 K/UL — SIGNIFICANT CHANGE UP (ref 0–0.5)
EOSINOPHIL NFR BLD AUTO: 0.4 % — SIGNIFICANT CHANGE UP (ref 0–6)
ESTIMATED AVERAGE GLUCOSE: 189 — SIGNIFICANT CHANGE UP
GLUCOSE BLDC GLUCOMTR-MCNC: 225 MG/DL — HIGH (ref 70–99)
GLUCOSE BLDC GLUCOMTR-MCNC: 334 MG/DL — HIGH (ref 70–99)
GLUCOSE BLDC GLUCOMTR-MCNC: 380 MG/DL — HIGH (ref 70–99)
GLUCOSE BLDC GLUCOMTR-MCNC: 95 MG/DL — SIGNIFICANT CHANGE UP (ref 70–99)
GLUCOSE SERPL-MCNC: 338 MG/DL — HIGH (ref 70–99)
HCT VFR BLD CALC: 33 % — LOW (ref 34.5–45)
HGB BLD-MCNC: 10.6 G/DL — LOW (ref 11.5–15.5)
IANC: 9.39 K/UL — HIGH (ref 1.8–7.4)
IMM GRANULOCYTES NFR BLD AUTO: 0.8 % — SIGNIFICANT CHANGE UP (ref 0–1.5)
INR BLD: 1.16 RATIO — SIGNIFICANT CHANGE UP (ref 0.88–1.16)
LEGIONELLA AG UR QL: NEGATIVE — SIGNIFICANT CHANGE UP
LYMPHOCYTES # BLD AUTO: 1.24 K/UL — SIGNIFICANT CHANGE UP (ref 1–3.3)
LYMPHOCYTES # BLD AUTO: 10.4 % — LOW (ref 13–44)
MCHC RBC-ENTMCNC: 29 PG — SIGNIFICANT CHANGE UP (ref 27–34)
MCHC RBC-ENTMCNC: 32.1 GM/DL — SIGNIFICANT CHANGE UP (ref 32–36)
MCV RBC AUTO: 90.4 FL — SIGNIFICANT CHANGE UP (ref 80–100)
MONOCYTES # BLD AUTO: 1.15 K/UL — HIGH (ref 0–0.9)
MONOCYTES NFR BLD AUTO: 9.6 % — SIGNIFICANT CHANGE UP (ref 2–14)
MRSA PCR RESULT.: SIGNIFICANT CHANGE UP
NEUTROPHILS # BLD AUTO: 9.39 K/UL — HIGH (ref 1.8–7.4)
NEUTROPHILS NFR BLD AUTO: 78.6 % — HIGH (ref 43–77)
NRBC # BLD: 0 /100 WBCS — SIGNIFICANT CHANGE UP
NRBC # FLD: 0 K/UL — SIGNIFICANT CHANGE UP
PLATELET # BLD AUTO: 471 K/UL — HIGH (ref 150–400)
POTASSIUM SERPL-MCNC: 3 MMOL/L — LOW (ref 3.5–5.3)
POTASSIUM SERPL-SCNC: 3 MMOL/L — LOW (ref 3.5–5.3)
PROT SERPL-MCNC: 6.8 G/DL — SIGNIFICANT CHANGE UP (ref 6–8.3)
PROTHROM AB SERPL-ACNC: 13.5 SEC — HIGH (ref 10.5–13.4)
RBC # BLD: 3.65 M/UL — LOW (ref 3.8–5.2)
RBC # FLD: 14.6 % — HIGH (ref 10.3–14.5)
S AUREUS DNA NOSE QL NAA+PROBE: SIGNIFICANT CHANGE UP
SODIUM SERPL-SCNC: 130 MMOL/L — LOW (ref 135–145)
SPECIMEN SOURCE: SIGNIFICANT CHANGE UP
WBC # BLD: 11.94 K/UL — HIGH (ref 3.8–10.5)
WBC # FLD AUTO: 11.94 K/UL — HIGH (ref 3.8–10.5)

## 2022-07-26 PROCEDURE — 99222 1ST HOSP IP/OBS MODERATE 55: CPT | Mod: GC

## 2022-07-26 PROCEDURE — 99233 SBSQ HOSP IP/OBS HIGH 50: CPT

## 2022-07-26 RX ORDER — ALLOPURINOL 300 MG
0 TABLET ORAL
Qty: 0 | Refills: 0 | DISCHARGE

## 2022-07-26 RX ORDER — ALLOPURINOL 300 MG
100 TABLET ORAL DAILY
Refills: 0 | Status: DISCONTINUED | OUTPATIENT
Start: 2022-07-26 | End: 2022-08-02

## 2022-07-26 RX ORDER — NIFEDIPINE 30 MG
1 TABLET, EXTENDED RELEASE 24 HR ORAL
Qty: 0 | Refills: 0 | DISCHARGE

## 2022-07-26 RX ORDER — METFORMIN HYDROCHLORIDE 850 MG/1
1 TABLET ORAL
Qty: 0 | Refills: 0 | DISCHARGE

## 2022-07-26 RX ORDER — POTASSIUM CHLORIDE 20 MEQ
20 PACKET (EA) ORAL ONCE
Refills: 0 | Status: COMPLETED | OUTPATIENT
Start: 2022-07-26 | End: 2022-07-26

## 2022-07-26 RX ORDER — METOPROLOL TARTRATE 50 MG
1 TABLET ORAL
Qty: 0 | Refills: 0 | DISCHARGE

## 2022-07-26 RX ORDER — VANCOMYCIN HCL 1 G
1000 VIAL (EA) INTRAVENOUS EVERY 12 HOURS
Refills: 0 | Status: DISCONTINUED | OUTPATIENT
Start: 2022-07-26 | End: 2022-07-26

## 2022-07-26 RX ORDER — BUDESONIDE, GLYCOPYRROLATE, AND FORMOTEROL FUMARATE 160; 9; 4.8 UG/1; UG/1; UG/1
1 AEROSOL, METERED RESPIRATORY (INHALATION)
Qty: 0 | Refills: 0 | DISCHARGE

## 2022-07-26 RX ORDER — TIOTROPIUM BROMIDE 18 UG/1
1 CAPSULE ORAL; RESPIRATORY (INHALATION)
Qty: 0 | Refills: 0 | DISCHARGE

## 2022-07-26 RX ORDER — HYDRALAZINE HCL 50 MG
1 TABLET ORAL
Qty: 0 | Refills: 0 | DISCHARGE

## 2022-07-26 RX ORDER — LOSARTAN POTASSIUM 100 MG/1
1 TABLET, FILM COATED ORAL
Qty: 0 | Refills: 0 | DISCHARGE

## 2022-07-26 RX ORDER — REPAGLINIDE 1 MG/1
1 TABLET ORAL
Qty: 0 | Refills: 0 | DISCHARGE

## 2022-07-26 RX ORDER — HYDRALAZINE HCL 50 MG
10 TABLET ORAL
Refills: 0 | Status: DISCONTINUED | OUTPATIENT
Start: 2022-07-26 | End: 2022-08-02

## 2022-07-26 RX ORDER — TIOTROPIUM BROMIDE 18 UG/1
0 CAPSULE ORAL; RESPIRATORY (INHALATION)
Qty: 0 | Refills: 0 | DISCHARGE

## 2022-07-26 RX ORDER — ASPIRIN/CALCIUM CARB/MAGNESIUM 324 MG
0 TABLET ORAL
Qty: 0 | Refills: 0 | DISCHARGE

## 2022-07-26 RX ORDER — ASPIRIN/CALCIUM CARB/MAGNESIUM 324 MG
81 TABLET ORAL DAILY
Refills: 0 | Status: DISCONTINUED | OUTPATIENT
Start: 2022-07-26 | End: 2022-08-02

## 2022-07-26 RX ORDER — EZETIMIBE 10 MG/1
1 TABLET ORAL
Qty: 0 | Refills: 0 | DISCHARGE

## 2022-07-26 RX ORDER — PANTOPRAZOLE SODIUM 20 MG/1
1 TABLET, DELAYED RELEASE ORAL
Qty: 0 | Refills: 0 | DISCHARGE

## 2022-07-26 RX ORDER — MOMETASONE FUROATE AND FORMOTEROL FUMARATE DIHYDRATE 200; 5 UG/1; UG/1
2 AEROSOL RESPIRATORY (INHALATION)
Qty: 0 | Refills: 0 | DISCHARGE

## 2022-07-26 RX ORDER — DAPAGLIFLOZIN 10 MG/1
1 TABLET, FILM COATED ORAL
Qty: 0 | Refills: 0 | DISCHARGE

## 2022-07-26 RX ADMIN — PIPERACILLIN AND TAZOBACTAM 25 GRAM(S): 4; .5 INJECTION, POWDER, LYOPHILIZED, FOR SOLUTION INTRAVENOUS at 02:53

## 2022-07-26 RX ADMIN — Medication 81 MILLIGRAM(S): at 19:42

## 2022-07-26 RX ADMIN — CHLORHEXIDINE GLUCONATE 1 APPLICATION(S): 213 SOLUTION TOPICAL at 12:15

## 2022-07-26 RX ADMIN — BUDESONIDE AND FORMOTEROL FUMARATE DIHYDRATE 2 PUFF(S): 160; 4.5 AEROSOL RESPIRATORY (INHALATION) at 20:36

## 2022-07-26 RX ADMIN — PIPERACILLIN AND TAZOBACTAM 25 GRAM(S): 4; .5 INJECTION, POWDER, LYOPHILIZED, FOR SOLUTION INTRAVENOUS at 11:38

## 2022-07-26 RX ADMIN — Medication 10 MILLIGRAM(S): at 19:42

## 2022-07-26 RX ADMIN — Medication 100 MILLIGRAM(S): at 11:37

## 2022-07-26 RX ADMIN — HEPARIN SODIUM 5000 UNIT(S): 5000 INJECTION INTRAVENOUS; SUBCUTANEOUS at 06:01

## 2022-07-26 RX ADMIN — PIPERACILLIN AND TAZOBACTAM 25 GRAM(S): 4; .5 INJECTION, POWDER, LYOPHILIZED, FOR SOLUTION INTRAVENOUS at 18:53

## 2022-07-26 RX ADMIN — Medication 100 MILLIGRAM(S): at 19:42

## 2022-07-26 RX ADMIN — Medication 250 MILLIGRAM(S): at 07:13

## 2022-07-26 RX ADMIN — VALSARTAN 320 MILLIGRAM(S): 80 TABLET ORAL at 06:01

## 2022-07-26 RX ADMIN — TIOTROPIUM BROMIDE 1 CAPSULE(S): 18 CAPSULE ORAL; RESPIRATORY (INHALATION) at 10:10

## 2022-07-26 RX ADMIN — Medication 25 MILLIGRAM(S): at 06:01

## 2022-07-26 RX ADMIN — Medication 20 MILLIEQUIVALENT(S): at 09:07

## 2022-07-26 RX ADMIN — Medication 30 MILLIGRAM(S): at 06:00

## 2022-07-26 RX ADMIN — AZITHROMYCIN 255 MILLIGRAM(S): 500 TABLET, FILM COATED ORAL at 00:38

## 2022-07-26 RX ADMIN — Medication 4: at 08:37

## 2022-07-26 RX ADMIN — HEPARIN SODIUM 5000 UNIT(S): 5000 INJECTION INTRAVENOUS; SUBCUTANEOUS at 18:52

## 2022-07-26 RX ADMIN — Medication 3: at 22:16

## 2022-07-26 RX ADMIN — PANTOPRAZOLE SODIUM 40 MILLIGRAM(S): 20 TABLET, DELAYED RELEASE ORAL at 06:01

## 2022-07-26 RX ADMIN — Medication 50 MILLIGRAM(S): at 06:01

## 2022-07-26 RX ADMIN — AZITHROMYCIN 255 MILLIGRAM(S): 500 TABLET, FILM COATED ORAL at 22:16

## 2022-07-26 RX ADMIN — Medication 2: at 18:28

## 2022-07-26 RX ADMIN — BUDESONIDE AND FORMOTEROL FUMARATE DIHYDRATE 2 PUFF(S): 160; 4.5 AEROSOL RESPIRATORY (INHALATION) at 10:09

## 2022-07-26 NOTE — CONSULT NOTE ADULT - SUBJECTIVE AND OBJECTIVE BOX
Patient is a 81y old  Female who presents with a chief complaint of Pneumonia due to organism    HPI:  80 yo F former smoker (quit in ) with h/o reactive airway disease not on home O2, lung ca s/p lobectomy and radiotherapy, last radiation treatment in April, DM2, HTN, glaucoma, CAD presents to hospital with increasing generalized weakness, subjective fevers associated with cough with greenish sputum production that began 2 weeks ago. Patient also reports loose stools for few days specially after eating.  Upon ER presentation, patient was afebrile, labs were significant for leukocytosis with WBC of 14.12. CT chest showed bilateral multi lobar PNA, negative for PE,  UA negative for UTI, RVP/Covid neg. Patient was started on Vancomycin, Zosyn and azithromycin and ID was consulted for further recs.    prior hospital charts reviewed [X]  primary team notes reviewed [X]  other consultant notes reviewed [X]    PAST MEDICAL & SURGICAL HISTORY:  Hypertension    Diabetes    Coronary arteriosclerosis    Hyperlipidemia    Peripheral arterial disease    Lung cancer  (s/p left lobectomy)    Former smoker  Quit ~    Valvular heart disease    Hypertension    Hypercholesterolemia    CAD (coronary artery disease)  cardiac stent ; 3/18 per pt    GERD (gastroesophageal reflux disease)    Diabetes mellitus    Gout    Lung cancer  tx surgically ; pt denies chemo , denies rt    Asthma    S/P total abdominal hysterectomy    S/P coronary angiogram  no stent.  50% stenosis mid RCA [facility] otherwise no stenosis    H/O:  section    S/P lobectomy of lung    S/P thoracotomy  Left ; unsure regarding exact procedure    History of uterine prolapse  Tx surgically      Allergies  No Known Drug Allergies  Nuts (Pruritus)    ANTIMICROBIALS (past 90 days)  MEDICATIONS  (STANDING):    azithromycin  IVPB   255 mL/Hr IV Intermittent (22 @ 00:38)    cefTRIAXone   IVPB   100 mL/Hr IV Intermittent (22 @ 20:10)    piperacillin/tazobactam IVPB.   200 mL/Hr IV Intermittent (22 @ 23:32)    piperacillin/tazobactam IVPB..   25 mL/Hr IV Intermittent (22 @ 11:38)   25 mL/Hr IV Intermittent (22 @ 02:53)    vancomycin  IVPB   250 mL/Hr IV Intermittent (22 @ 07:13)      azithromycin  IVPB    azithromycin  IVPB 500 every 24 hours  piperacillin/tazobactam IVPB.. 3.375 every 8 hours  vancomycin  IVPB 1000 every 12 hours    MEDICATIONS  (STANDING):  ALBUTerol    90 MICROgram(s) HFA Inhaler 2 every 6 hours PRN  benzonatate 100 three times a day PRN  budesonide 160 MICROgram(s)/formoterol 4.5 MICROgram(s) Inhaler 2 two times a day  dextrose 50% Injectable 25 once  dextrose 50% Injectable 12.5 once  dextrose 50% Injectable 25 once  dextrose Oral Gel 15 once PRN  glucagon  Injectable 1 once  heparin   Injectable 5000 every 12 hours  hydrochlorothiazide 25 daily  insulin lispro (ADMELOG) corrective regimen sliding scale  three times a day before meals  insulin lispro (ADMELOG) corrective regimen sliding scale  at bedtime  metoprolol succinate ER 50 daily  NIFEdipine XL 30 daily  pantoprazole    Tablet 40 before breakfast  pregabalin 100 daily  tiotropium 18 MICROgram(s) Capsule 1 daily  valsartan 320 daily    SOCIAL HISTORY:  former smoker ( quit in )    FAMILY HISTORY:  Family history of heart disease    Family history of coronary artery disease (Father, Mother)      REVIEW OF SYSTEMS  [  ] ROS unobtainable because:    [X] All other systems negative except as noted below:	    Constitutional:  [ ] fever [ ] chills  [ ] weight loss  [ ] weakness  Skin:  [ ] rash [ ] phlebitis	  Eyes: [ ] icterus [ ] pain  [ ] discharge	  ENMT: [ ] sore throat  [ ] thrush [ ] ulcers [ ] exudates  Respiratory: [ ] dyspnea [ ] hemoptysis [ ] cough [ ] sputum	  Cardiovascular:  [ ] chest pain [ ] palpitations [ ] edema	  Gastrointestinal:  [ ] nausea [ ] vomiting [ ] diarrhea [ ] constipation [ ] pain	  Genitourinary:  [ ] dysuria [ ] frequency [ ] hematuria [ ] discharge [ ] flank pain  [ ] incontinence  Musculoskeletal:  [ ] myalgias [ ] arthralgias [ ] arthritis  [ ] back pain  Neurological:  [ ] headache [ ] seizures  [ ] confusion/altered mental status  Psychiatric:  [ ] anxiety [ ] depression	  Hematology/Lymphatics:  [ ] lymphadenopathy  Endocrine:  [ ] adrenal [ ] thyroid  Allergic/Immunologic:	 [ ] transplant [ ] seasonal    Vital Signs Last 24 Hrs  T(F): 98.5 (22 @ 13:31), Max: 98.6 (22 @ 23:49)  Vital Signs Last 24 Hrs  HR: 86 (22 @ 13:31) (86 - 104)  BP: 154/71 (22 @ 13:31) (154/71 - 183/79)  RR: 16 (22 @ 06:01)  SpO2: 97% (22 @ 13:31) (94% - 97%)  Wt(kg): --    PHYSICAL EXAM:  Constitutional: non-toxic, no distress  HEAD/EYES: anicteric, no conjunctival injection  ENT:  supple, no thrush  Cardiovascular:   normal S1, S2, no murmur, no edema  Respiratory:  decrease left sided air entry, right sided crackles   GI:  soft, non-tender, normal bowel sounds  :  no trejo, no CVA tenderness  Musculoskeletal:  no synovitis, normal ROM  Neurologic: awake and alert, normal strength, no focal findings  Skin:  no rash, no erythema, no phlebitis  Heme/Onc: no lymphadenopathy   Psychiatric:  awake, alert, appropriate mood                 10.6   11.94 )-----------( 471      ( 2022 05:43 )             33.0       130<L>  |  92<L>  |  14  ----------------------------<  338<H>  3.0<L>   |  28  |  0.85    Ca    8.7      2022 05:43    TPro  6.8  /  Alb  2.6<L>  /  TBili  0.3  /  DBili  x   /  AST  17  /  ALT  25  /  AlkPhos  75      Urinalysis Basic - ( 2022 15:00 )    Color: Light Yellow / Appearance: Clear / S.011 / pH: x  Gluc: x / Ketone: Trace  / Bili: Negative / Urobili: <2 mg/dL   Blood: x / Protein: 30 mg/dL / Nitrite: Negative   Leuk Esterase: Negative / RBC: 4 /HPF / WBC 2 /HPF   Sq Epi: x / Non Sq Epi: 2 /HPF / Bacteria: Negative    MICROBIOLOGY:    Rapid RVP Result: NotDetec ( @ 13:42)    RADIOLOGY:  imaging below personally reviewed and agree with findings      < from: CT Angio Chest PE Protocol w/ IV Cont (22 @ 17:35) >  IMPRESSION:  No pulmonary embolism.  Bilateral multi lobar pneumonia.    --- End of Report ---    < end of copied text >   Patient is a 81y old  Female who presents with a chief complaint of Pneumonia due to organism    HPI:  82 yo F former smoker (quit in ) with h/o reactive airway disease not on home O2, lung ca s/p left lobectomy and Right sided cancer s/p radiotherapy, last radiation treatment in April, DM2, HTN, glaucoma, CAD presents to hospital with increasing generalized weakness, subjective fevers associated with cough with greenish sputum production that began 2 weeks ago. Patient also reports loose stools for few days specially after eating.  Upon ER presentation, patient was afebrile, labs were significant for leukocytosis with WBC of 14.12. CT chest showed bilateral multi lobar PNA, negative for PE,  UA negative for UTI, RVP/Covid neg. Patient was started on Vancomycin, Zosyn and azithromycin and ID was consulted for further recs.    prior hospital charts reviewed [X]  primary team notes reviewed [X]  other consultant notes reviewed [X]    PAST MEDICAL & SURGICAL HISTORY:  Hypertension    Diabetes    Coronary arteriosclerosis    Hyperlipidemia    Peripheral arterial disease    Lung cancer  (s/p left lobectomy)    Former smoker  Quit ~    Valvular heart disease    Hypertension    Hypercholesterolemia    CAD (coronary artery disease)  cardiac stent ; 3/18 per pt    GERD (gastroesophageal reflux disease)    Diabetes mellitus    Gout    Lung cancer  tx surgically ; pt denies chemo , denies rt    Asthma    S/P total abdominal hysterectomy    S/P coronary angiogram  no stent.  50% stenosis mid RCA 13 [facility] otherwise no stenosis    H/O:  section    S/P lobectomy of lung    S/P thoracotomy  Left ; unsure regarding exact procedure    History of uterine prolapse  Tx surgically      Allergies  No Known Drug Allergies  Nuts (Pruritus)    ANTIMICROBIALS (past 90 days)  MEDICATIONS  (STANDING):    azithromycin  IVPB   255 mL/Hr IV Intermittent (22 @ 00:38)    cefTRIAXone   IVPB   100 mL/Hr IV Intermittent (22 @ 20:10)    piperacillin/tazobactam IVPB.   200 mL/Hr IV Intermittent (22 @ 23:32)    piperacillin/tazobactam IVPB..   25 mL/Hr IV Intermittent (22 @ 11:38)   25 mL/Hr IV Intermittent (22 @ 02:53)    vancomycin  IVPB   250 mL/Hr IV Intermittent (22 @ 07:13)      azithromycin  IVPB    azithromycin  IVPB 500 every 24 hours  piperacillin/tazobactam IVPB.. 3.375 every 8 hours  vancomycin  IVPB 1000 every 12 hours    MEDICATIONS  (STANDING):  ALBUTerol    90 MICROgram(s) HFA Inhaler 2 every 6 hours PRN  benzonatate 100 three times a day PRN  budesonide 160 MICROgram(s)/formoterol 4.5 MICROgram(s) Inhaler 2 two times a day  dextrose 50% Injectable 25 once  dextrose 50% Injectable 12.5 once  dextrose 50% Injectable 25 once  dextrose Oral Gel 15 once PRN  glucagon  Injectable 1 once  heparin   Injectable 5000 every 12 hours  hydrochlorothiazide 25 daily  insulin lispro (ADMELOG) corrective regimen sliding scale  three times a day before meals  insulin lispro (ADMELOG) corrective regimen sliding scale  at bedtime  metoprolol succinate ER 50 daily  NIFEdipine XL 30 daily  pantoprazole    Tablet 40 before breakfast  pregabalin 100 daily  tiotropium 18 MICROgram(s) Capsule 1 daily  valsartan 320 daily    SOCIAL HISTORY:  former smoker ( quit in )    FAMILY HISTORY:  Family history of heart disease    Family history of coronary artery disease (Father, Mother)      REVIEW OF SYSTEMS  [  ] ROS unobtainable because:    [X] All other systems negative except as noted below:	    Constitutional:  [X] fever [ ] chills  [ ] weight loss  [ ] weakness  Skin:  [ ] rash [ ] phlebitis	  Eyes: [ ] icterus [ ] pain  [ ] discharge	  ENMT: [ ] sore throat  [ ] thrush [ ] ulcers [ ] exudates  Respiratory: [ ] dyspnea [ ] hemoptysis [X] cough [X] sputum	  Cardiovascular:  [ ] chest pain [ ] palpitations [ ] edema	  Gastrointestinal:  [ ] nausea [ ] vomiting [X] diarrhea [ ] constipation [ ] pain	  Genitourinary:  [ ] dysuria [ ] frequency [ ] hematuria [ ] discharge [ ] flank pain  [ ] incontinence  Musculoskeletal:  [ ] myalgias [ ] arthralgias [ ] arthritis  [ ] back pain  Neurological:  [ ] headache [ ] seizures  [ ] confusion/altered mental status  Psychiatric:  [ ] anxiety [ ] depression	  Hematology/Lymphatics:  [ ] lymphadenopathy  Endocrine:  [ ] adrenal [ ] thyroid  Allergic/Immunologic:	 [ ] transplant [ ] seasonal    Vital Signs Last 24 Hrs  T(F): 98.5 (22 @ 13:31), Max: 98.6 (22 @ 23:49)  Vital Signs Last 24 Hrs  HR: 86 (22 @ 13:31) (86 - 104)  BP: 154/71 (22 @ 13:31) (154/71 - 183/79)  RR: 16 (22 @ 06:01)  SpO2: 97% (22 @ 13:31) (94% - 97%)  Wt(kg): --    PHYSICAL EXAM:  Constitutional: non-toxic, no distress  HEAD/EYES: anicteric, no conjunctival injection  ENT:  supple, no thrush  Cardiovascular:   normal S1, S2, no murmur, no edema  Respiratory:  decrease left sided air entry, right sided crackles   GI:  soft, non-tender, normal bowel sounds  :  no trejo, no CVA tenderness  Musculoskeletal:  no synovitis, normal ROM  Neurologic: awake and alert, normal strength, no focal findings  Skin:  no rash, no erythema, no phlebitis  Heme/Onc: no lymphadenopathy   Psychiatric:  awake, alert, appropriate mood                 10.6   11.94 )-----------( 471      ( 2022 05:43 )             33.0       130<L>  |  92<L>  |  14  ----------------------------<  338<H>  3.0<L>   |  28  |  0.85    Ca    8.7      2022 05:43    TPro  6.8  /  Alb  2.6<L>  /  TBili  0.3  /  DBili  x   /  AST  17  /  ALT  25  /  AlkPhos  75      Urinalysis Basic - ( 2022 15:00 )    Color: Light Yellow / Appearance: Clear / S.011 / pH: x  Gluc: x / Ketone: Trace  / Bili: Negative / Urobili: <2 mg/dL   Blood: x / Protein: 30 mg/dL / Nitrite: Negative   Leuk Esterase: Negative / RBC: 4 /HPF / WBC 2 /HPF   Sq Epi: x / Non Sq Epi: 2 /HPF / Bacteria: Negative    MICROBIOLOGY:    Rapid RVP Result: NotDetec ( @ 13:42)    RADIOLOGY:  imaging below personally reviewed and agree with findings      < from: CT Angio Chest PE Protocol w/ IV Cont (22 @ 17:35) >  IMPRESSION:  No pulmonary embolism.  Bilateral multi lobar pneumonia.    --- End of Report ---    < end of copied text >   Patient is a 81y old  Female who presents with a chief complaint of Pneumonia due to organism    HPI:  81F former smoker (quit in ) with h/o reactive airway disease not on home O2, L lung ca s/p lobectomy 3-4 years ago with recurrence R lung s/p RT.  Also with DM2, HTN, glaucoma, CAD.  Admitted 2022 with increased generalized weakness, subjective fevers associated with cough with greenish sputum production that began 2 weeks ago.  Also with diarrhea.  In the ER, she was afebrile with WBC 14K.  Not hypoxic.  CT done with multifocal pneumonia.  She was started on vanc/zosyn and azithromycin.  RVP (-)    ID asked to help management.    prior hospital charts reviewed [X]  primary team notes reviewed [X]  other consultant notes reviewed [X]    PAST MEDICAL & SURGICAL HISTORY:  Hypertension  Diabetes Mellitus (A1c=8.2)  Hyperlipidemia  Peripheral arterial disease  Left Lung Cancer s/p left lobectomy   Valvular heart disease  Hypertension  Hypercholesterolemia  CAD s/p cardiac stent 3/18  GERD  Diabetes mellitus  Gout  Asthma  H/O:  section  History of uterine prolapse S/P total abdominal hysterectomy    Allergies  No Known Drug Allergies  Nuts (Pruritus)    ANTIMICROBIALS:  cefTRIAXone   IVPB (7/25 x1)    active:  azithromycin  IVPB 500 every 24 hours (-)  piperacillin/tazobactam IVPB.. 3.375 every 8 hours (-)  vancomycin  IVPB 1000 every 12 hours (-)    MEDICATIONS  (STANDING):  budesonide 160 MICROgram(s)/formoterol 4.5 MICROgram(s) Inhaler 2 two times a day  heparin   Injectable 5000 every 12 hours  hydrochlorothiazide 25 daily  insulin lispro (ADMELOG) corrective regimen sliding scale  three times a day before meals  insulin lispro (ADMELOG) corrective regimen sliding scale  at bedtime  metoprolol succinate ER 50 daily  NIFEdipine XL 30 daily  pantoprazole    Tablet 40 before breakfast  pregabalin 100 daily  tiotropium 18 MICROgram(s) Capsule 1 daily  valsartan 320 daily    SOCIAL HISTORY:  former smoker ( quit in )    FAMILY HISTORY:  Family history of heart disease  Family history of coronary artery disease (Father, Mother)    REVIEW OF SYSTEMS  [  ] ROS unobtainable because:    [X] All other systems negative except as noted below:	    Constitutional:  [ ] fever [ ] chills  [ ] weight loss  [ ] weakness  Skin:  [ ] rash [ ] phlebitis	  Eyes: [ ] icterus [ ] pain  [ ] discharge	  ENMT: [ ] sore throat  [ ] thrush [ ] ulcers [ ] exudates  Respiratory: [ ] dyspnea [ ] hemoptysis [ ] cough [ ] sputum	  Cardiovascular:  [ ] chest pain [ ] palpitations [ ] edema	  Gastrointestinal:  [ ] nausea [ ] vomiting [ ] diarrhea [ ] constipation [ ] pain	  Genitourinary:  [ ] dysuria [ ] frequency [ ] hematuria [ ] discharge [ ] flank pain  [ ] incontinence  Musculoskeletal:  [ ] myalgias [ ] arthralgias [ ] arthritis  [ ] back pain  Neurological:  [ ] headache [ ] seizures  [ ] confusion/altered mental status  Psychiatric:  [ ] anxiety [ ] depression	  Hematology/Lymphatics:  [ ] lymphadenopathy  Endocrine:  [ ] adrenal [ ] thyroid  Allergic/Immunologic:	 [ ] transplant [ ] seasonal    Vital Signs Last 24 Hrs  T(F): 98.5 (22 @ 13:31), Max: 98.6 (22 @ 23:49)  Vital Signs Last 24 Hrs  HR: 86 (22 @ 13:31) (86 - 104)  BP: 154/71 (22 @ 13:31) (154/71 - 183/79)  RR: 16 (22 @ 06:01)  SpO2: 97% (22 @ 13:31) (94% - 97%)  Wt(kg): --    PHYSICAL EXAM:  Constitutional: non-toxic, no distress  HEAD/EYES: anicteric  ENT:  supple  Cardiovascular:   normal S1, S2  Respiratory:  decrease left sided air entry, right sided crackles   GI:  soft, non-tender, normal bowel sounds  :  no trejo, no CVA tenderness  Musculoskeletal:  no synovitis  Neurologic: awake and alert, normal strength, no focal findings  Skin:  no rash  Psychiatric:  awake, alert, appropriate mood               10.6   11.94 )-----------( 471      ( 2022 05:43 )             33.0   130<L>  |  92<L>  |  14  ----------------------------<  338<H>  3.0<L>   |  28  |  0.85    Ca    8.7      2022 05:43    TPro  6.8  /  Alb  2.6<L>  /  TBili  0.3  /  DBili  x   /  AST  17  /  ALT  25  /  AlkPhos  75      WBC Count: 11.94 (22 @ 05:43)  WBC Count: 14.12 (22 @ 13:30)    Urinalysis Basic - ( 2022 15:00 )  Color: Light Yellow / Appearance: Clear / S.011 / pH: x  Gluc: x / Ketone: Trace  / Bili: Negative / Urobili: <2 mg/dL   Blood: x / Protein: 30 mg/dL / Nitrite: Negative   Leuk Esterase: Negative / RBC: 4 /HPF / WBC 2 /HPF   Sq Epi: x / Non Sq Epi: 2 /HPF / Bacteria: Negative    MICROBIOLOGY:  Rapid RVP Result: NotDetec ( @ 13:42)    RADIOLOGY:  imaging below personally reviewed and agree with findings    CT Angio Chest PE Protocol w/ IV Cont (22 @ 17:35) >  IMPRESSION:  No pulmonary embolism.  Bilateral multi lobar pneumonia.

## 2022-07-26 NOTE — DIETITIAN INITIAL EVALUATION ADULT - ORAL INTAKE PTA/DIET HISTORY
Patient seen for assessment with daughter at bed side. Reports decreased appetite and PO intake >1 week PTA r/t weakness and diarrhea. Noted with A1c 8.2% (7/26) per chart indicating adequate glycemic control.

## 2022-07-26 NOTE — PHARMACOTHERAPY INTERVENTION NOTE - COMMENTS
Medication history is complete. Medication list updated in Outpatient Medication Record (OMR). Please call spectra [phone] if you have any questions. Medication history is complete. Medication list updated in Outpatient Medication Record (OMR). Medication list reviewed with patient at bedside and verified with outpatient pharmacy.

## 2022-07-26 NOTE — DIETITIAN NUTRITION RISK NOTIFICATION - TREATMENT: THE FOLLOWING DIET HAS BEEN RECOMMENDED
Diet, Consistent Carbohydrate Clear Liquid (07-26-22 @ 03:34) [Active]  Diet, Consistent Carbohydrate w/Evening Snack (07-25-22 @ 22:17) [Available for Activation]  Diet, Consistent Carbohydrate Full Liquid (07-25-22 @ 22:17) [Available for Activation]

## 2022-07-26 NOTE — DIETITIAN INITIAL EVALUATION ADULT - OTHER INFO
81 year old female with a PMH of lung cancer s/p lobectomy and recent occurrence s/p radiation, DM, HTN, HLD, CAD, GERD presents with diarrhea, intermittent fever, cough, chest tightness and progressive weakness since (7/8) per chart.    Patient currently on clear liquids per chart, tolerated well but reports having diarrhea this morning. No N/V reported. Has food allergy to nuts. Reports UBW is around 130 lbs. ABW is 149.9 lbs. (7/25) per chart suggesting a +15.3% weight gain, will monitor. No edema or pressure injuries noted per RN flow sheet.    Educated on well balanced diet, foods that aid with diarrhea, small frequent meals and keeping in mind of carbohydrate portions.

## 2022-07-26 NOTE — CONSULT NOTE ADULT - ATTENDING SUPERVISION STATEMENT
Note Text (......Xxx Chief Complaint.): This diagnosis correlates with the
Render Risk Assessment In Note?: no
Other (Free Text): Recommended a Retinol
Detail Level: Zone
Fellow

## 2022-07-26 NOTE — CONSULT NOTE ADULT - ASSESSMENT
82 yo F former smoker (quit in 1983) with h/o reactive airway disease not on home O2, lung ca s/p lobectomy and radiotherapy, last radiation treatment in April, DM2, HTN, glaucoma, CAD presents to hospital with increasing generalized weakness, subjective fevers associated with cough with greenish sputum production that began 2 weeks ago. Patient also reports loose stools for few days specially after eating. CT chest showed bilateral multi lobar PNA,       82 yo F former smoker (quit in 1983) with h/o reactive airway disease not on home O2, lung ca s/p lobectomy and radiotherapy, last radiation treatment in April, DM2, HTN, glaucoma, CAD presents to hospital with increasing generalized weakness, subjective fevers associated with cough with greenish sputum production that began 2 weeks ago. Patient also reports loose stools for few days specially after eating. CT chest showed bilateral multi lobar PNA. Started on Vancomycin, Zosyn and Azithromycin. ID was consulted.    Afebrile   Leukocytosis 14.12---> 11.94 trending down   RVP neg   Covid PCR neg   CT chest: bilateral multifocal pneumonia     //Bilateral multifocal pneumonia - possible aspiration pneumonia   //Afebrile, leukocytosis trending down     Recommendations:  Continue Vancomycin 1 g IV q 12hrs  Follow Vancomycin trough prior to 4th dose  Continue Zosyn 3.375 g IV q 8hrs   Continue Azithromycin 500 mg IV daily   Send sputum culture   Follow MRSA PCR   Follow Blood culture  Follow Legionella Urine Ag - Discontinue Azithromycin if Legionella urine Ag negative      Seen and discussed with Dr Lisa Kearney MD, PGY4  ID fellow  [employer] Teams Preferred  After 5pm/weekends call [phone]

## 2022-07-26 NOTE — CONSULT NOTE ADULT - ATTENDING COMMENTS
81F former smoker (quit in 1983) with h/o reactive airway disease not on home O2, L lung ca s/p lobectomy 3-4 years ago with recurrence R lung s/p RT.  Also with DM2, HTN, glaucoma, CAD.  Admitted 7/25/2022 with increased generalized weakness, subjective fevers associated with cough with greenish sputum production that began 2 weeks ago.  Also with diarrhea.  In the ER, she was afebrile with WBC 14K.  Not hypoxic.  CT done with multifocal pneumonia.  She was started on vanc/zosyn and azithromycin.  RVP (-)    Pneumonia  - f/u MRSA screen  - zosyn for now  - if urine legionella Ag (-), d/c zithromycin  - hope to limit to 7days  - will transition to oral once feasible 81F former smoker (quit in 1983) with h/o reactive airway disease not on home O2, L lung ca s/p lobectomy 3-4 years ago with recurrence R lung s/p RT.  Also with DM2, HTN, glaucoma, CAD.  Admitted 7/25/2022 with increased generalized weakness, subjective fevers associated with cough with greenish sputum production that began 2 weeks ago.  Also with diarrhea.  In the ER, she was afebrile with WBC 14K.  Not hypoxic.  CT done with multifocal pneumonia.  She was started on vanc/zosyn and azithromycin.  RVP (-)    Pneumonia  - f/u MRSA screen  - zosyn okay as she is improving  - if urine legionella Ag (-), d/c azithromycin  - hope to limit to 7days  - transition to Augmentin if stable prior to completing course    I have discussed plan of care as detailed above with PA    Please call Infectious Diseases if there is a change in status.  Thank you.  [phone].

## 2022-07-26 NOTE — DIETITIAN INITIAL EVALUATION ADULT - ADD RECOMMEND
When medically feasible recommend advance diet to consistent carbohydrate as tolerated. Document PO intake to monitor trend.

## 2022-07-26 NOTE — DIETITIAN INITIAL EVALUATION ADULT - PERTINENT LABORATORY DATA
07-26    130<L>  |  92<L>  |  14  ----------------------------<  338<H>  3.0<L>   |  28  |  0.85    Ca    8.7      26 Jul 2022 05:43    TPro  6.8  /  Alb  2.6<L>  /  TBili  0.3  /  DBili  x   /  AST  17  /  ALT  25  /  AlkPhos  75  07-26  POCT Blood Glucose.: 334 mg/dL (07-26-22 @ 08:22)  A1C with Estimated Average Glucose Result: 8.2 % (07-26-22 @ 05:43)

## 2022-07-26 NOTE — CONSULT NOTE ADULT - SUBJECTIVE AND OBJECTIVE BOX
PULMONARY CONSULT NOTE      EVELIA SCHUSTER  MRN-[id number]    Patient is a 81y old  Female who presents with a chief complaint of weakness/pna (2022 21:38)      HISTORY OF PRESENT ILLNESS:  82 yo female former smoker, quit about 30 years ago, DM, HTN, HLD, PAD ,gets her care from [facility] has a history of lung ca s/p resection- Left upper? about 3-4  years ago, and recently this year wtih probable recurrence on the right, s/p 5 radiation sessions. no chemo therapy. she is on symbicort & spiriva , not on oxygen  she is admitted with almost 3 weeks of productive cough, was initially green- now clearing up. some dypsnea.  no previous history of similar events. she has multiple covid swabs negative    seen today on room air. some cough. productive. no chest pain. no n/v/diarrhea  no fevers at home  on IV antibiotics       Allergies    No Known Drug Allergies  Nuts (Pruritus)    Intolerances        PAST MEDICAL & SURGICAL HISTORY:  Hypertension      Diabetes      Coronary arteriosclerosis      Hyperlipidemia      Peripheral arterial disease      Lung cancer  (s/p left lobectomy)      Former smoker  Quit ~      Valvular heart disease      Hypertension      Hypercholesterolemia      CAD (coronary artery disease)  cardiac stent ; 3/18 per pt      GERD (gastroesophageal reflux disease)      Diabetes mellitus      Gout      Lung cancer  tx surgically ; pt denies chemo , denies rt      Asthma      S/P total abdominal hysterectomy      S/P coronary angiogram  no stent.  50% stenosis mid RCA [facility] otherwise no stenosis      H/O:  section      S/P lobectomy of lung      S/P thoracotomy  Left ; unsure regarding exact procedure      History of uterine prolapse  Tx surgically              FAMILY HISTORY:  Family history of heart disease    Family history of coronary artery disease (Father, Mother)      Prescriptions:      SOCIAL HISTORY  Smoking History:   quit 40 years ago    REVIEW OF SYSTEMS: PER HPI    Vital Signs Last 24 Hrs  T(C): 36.8 (2022 06:01), Max: 37 (2022 23:49)  T(F): 98.3 (2022 06:01), Max: 98.6 (2022 23:49)  HR: 96 (2022 06:01) (96 - 111)  BP: 155/68 (2022 06:01) (149/73 - 183/79)  BP(mean): 108 (2022 14:56) (108 - 108)  RR: 16 (2022 06:01) (16 - 18)  SpO2: 94% (2022 06:01) (94% - 100%)    Parameters below as of 2022 06:01  Patient On (Oxygen Delivery Method): room air        PHYSICAL EXAMINATION:    GENERAL: The patient is a well-developed, well-nourished female in no apparent distress.     HEENT: Head is normocephalic and atraumatic.         LUNGS: Clear to auscultation without wheezing, rales, or rhonchi. Respirations unlabored    HEART: Regular rate and rhythm without murmur.       EXTREMITIES: Without any c edema.    NEUROLOGIC: Grossly intact      MEDICATIONS  (STANDING):  azithromycin  IVPB      azithromycin  IVPB 500 milliGRAM(s) IV Intermittent every 24 hours  budesonide 160 MICROgram(s)/formoterol 4.5 MICROgram(s) Inhaler 2 Puff(s) Inhalation two times a day  chlorhexidine 2% Cloths 1 Application(s) Topical daily  dextrose 5%. 1000 milliLiter(s) (100 mL/Hr) IV Continuous <Continuous>  dextrose 5%. 1000 milliLiter(s) (50 mL/Hr) IV Continuous <Continuous>  dextrose 50% Injectable 25 Gram(s) IV Push once  dextrose 50% Injectable 12.5 Gram(s) IV Push once  dextrose 50% Injectable 25 Gram(s) IV Push once  glucagon  Injectable 1 milliGRAM(s) IntraMuscular once  heparin   Injectable 5000 Unit(s) SubCutaneous every 12 hours  hydrochlorothiazide 25 milliGRAM(s) Oral daily  insulin lispro (ADMELOG) corrective regimen sliding scale   SubCutaneous three times a day before meals  insulin lispro (ADMELOG) corrective regimen sliding scale   SubCutaneous at bedtime  metoprolol succinate ER 50 milliGRAM(s) Oral daily  NIFEdipine XL 30 milliGRAM(s) Oral daily  pantoprazole    Tablet 40 milliGRAM(s) Oral before breakfast  piperacillin/tazobactam IVPB.. 3.375 Gram(s) IV Intermittent every 8 hours  pregabalin 100 milliGRAM(s) Oral daily  tiotropium 18 MICROgram(s) Capsule 1 Capsule(s) Inhalation daily  valsartan 320 milliGRAM(s) Oral daily  vancomycin  IVPB 1000 milliGRAM(s) IV Intermittent every 12 hours      MEDICATIONS  (PRN):  ALBUTerol    90 MICROgram(s) HFA Inhaler 2 Puff(s) Inhalation every 6 hours PRN Shortness of Breath and/or Wheezing  dextrose Oral Gel 15 Gram(s) Oral once PRN Blood Glucose LESS THAN 70 milliGRAM(s)/deciliter        LABS:   CBC Full  -  ( 2022 05:43 )  WBC Count : 11.94 K/uL  RBC Count : 3.65 M/uL  Hemoglobin : 10.6 g/dL  Hematocrit : 33.0 %  Platelet Count - Automated : 471 K/uL  Mean Cell Volume : 90.4 fL  Mean Cell Hemoglobin : 29.0 pg  Mean Cell Hemoglobin Concentration : 32.1 gm/dL  Auto Neutrophil # : 9.39 K/uL  Auto Lymphocyte # : 1.24 K/uL  Auto Monocyte # : 1.15 K/uL  Auto Eosinophil # : 0.05 K/uL  Auto Basophil # : 0.02 K/uL  Auto Neutrophil % : 78.6 %  Auto Lymphocyte % : 10.4 %  Auto Monocyte % : 9.6 %  Auto Eosinophil % : 0.4 %  Auto Basophil % : 0.2 %    PT/INR - ( 2022 05:43 )   PT: 13.5 sec;   INR: 1.16 ratio         PTT - ( 2022 05:43 )  PTT:25.3 sec      130<L>  |  92<L>  |  14  ----------------------------<  338<H>  3.0<L>   |  28  |  0.85    Ca    8.7      2022 05:43    TPro  6.8  /  Alb  2.6<L>  /  TBili  0.3  /  DBili  x   /  AST  17  /  ALT  25  /  AlkPhos  75  -           RADIOLOGY & ADDITIONAL STUDIES:  < from: CT Abdomen and Pelvis w/ IV Cont (22 @ 17:35) >  PROCEDURE DATE:  2022          INTERPRETATION:  CLINICAL INFORMATION: Abdominal pain and diarrhea.    COMPARISON: CT abdomen and pelvis from 8/3/2020.    CONTRAST/COMPLICATIONS:  IV Contrast: Omnipaque 350  60 cc administered   0 cc discarded  Oral Contrast: NONE  Complications: None reported at time of study completion    PROCEDURE:  CT Angiography of the Chest was performed followed by portal venous phase   imaging of the Abdomen and Pelvis.  Sagittal and coronal reformats were performed as well as 3D (MIP)   reconstructions.    FINDINGS:  CHEST:  LUNGS AND LARGE AIRWAYS: Bilateral upper lobe and left lower lobe   consolidations.  PLEURA: No pleural effusion.  VESSELS: Contrast bolus is satisfactory. No main, right main, left main,   lobar or segmental pulmonary embolism. Limited evaluation of subsegmental   pulmonary arteries secondaryto motion and mixing artifact.  HEART: Heart size is normal. No pericardial effusion.  MEDIASTINUM AND YAJAIRA: No lymphadenopathy.  CHEST WALL AND LOWER NECK: Within normal limits.    ABDOMEN AND PELVIS:  LIVER: Within normal limits.  BILE DUCTS: Normal caliber.  GALLBLADDER: Within normal limits.  SPLEEN: Within normal limits.  PANCREAS: Within normal limits.  ADRENALS: Bilateral nodular adrenal glands, likely nodular hyperplasia.  KIDNEYS/URETERS: Right renal cysts. Bilateral hypodense foci too small to   characterize. No hydronephrosis.    BLADDER: Within normal limits.  REPRODUCTIVE ORGANS: Fibroid uterus.    BOWEL: No bowel obstruction. Appendix is normal. Colonic diverticulosis   without diverticulitis.  PERITONEUM: No ascites.  VESSELS: Atherosclerotic changes.  RETROPERITONEUM/LYMPH NODES: No lymphadenopathy.  ABDOMINAL WALL: Small fat-containing umbilical hernia.  BONES: Degenerative changes.    IMPRESSION:  No pulmonary embolism.  Bilateral multi lobar pneumonia.    --- End of Report ---          KAREN KAUFMAN MD; Resident Radiologist  This document has been electronically signed.  SUSHILA SANDOVAL MD; Attending Radiologist  This document has been electronically signed. 2022  6:17PM    < end of copied text >  < from: CT Angio Chest PE Protocol w/ IV Cont (22 @ 17:35) >  PROCEDURE:  CT Angiography of the Chest was performed followed by portal venous phase   imaging of the Abdomen and Pelvis.  Sagittal and coronal reformats were performed as well as 3D (MIP)   reconstructions.    FINDINGS:  CHEST:  LUNGS AND LARGE AIRWAYS: Bilateral upper lobe and left lower lobe   consolidations.  PLEURA: No pleural effusion.  VESSELS: Contrast bolus is satisfactory. No main, right main, left main,   lobar or segmental pulmonary embolism. Limited evaluation of subsegmental   pulmonary arteries secondaryto motion and mixing artifact.  HEART: Heart size is normal. No pericardial effusion.  MEDIASTINUM AND YAJAIRA: No lymphadenopathy.  CHEST WALL AND LOWER NECK: Within normal limits.    ABDOMEN AND PELVIS:  LIVER: Within normal limits.  BILE DUCTS: Normal caliber.  GALLBLADDER: Within normal limits.  SPLEEN: Within normal limits.  PANCREAS: Within normal limits.  ADRENALS: Bilateral nodular adrenal glands, likely nodular hyperplasia.  KIDNEYS/URETERS: Right renal cysts. Bilateral hypodense foci too small to   characterize. No hydronephrosis.    BLADDER: Within normal limits.  REPRODUCTIVE ORGANS: Fibroid uterus.    BOWEL: No bowel obstruction. Appendix is normal. Colonic diverticulosis   without diverticulitis.  PERITONEUM: No ascites.  VESSELS: Atherosclerotic changes.  RETROPERITONEUM/LYMPH NODES: No lymphadenopathy.  ABDOMINAL WALL: Small fat-containing umbilical hernia.  BONES: Degenerative changes.    IMPRESSION:  No pulmonary embolism.  Bilateral multi lobar pneumonia.    --- End of Report ---          KAREN KAUFMAN MD; Resident Radiologist  This document has been electronically signed.  SUSHILA SANDOVAL MD; Attending Radiologist  This document has been electronically signed. 2022  6:17PM    < end of copied text >  < from: Xray Chest 2 Views PA/Lat (20 @ 13:40) >    EXAM:  XR CHEST PA LAT 2V        PROCEDURE DATE:  Aug  3 2020         INTERPRETATION:  CLINICAL INDICATION: chest/abdominal pain    EXAM:  Frontal and lateral chest from 8/3/2020 at 1340. Compared to prior study from 2016.    IMPRESSION:  No subdiaphragmatic free air or abnormally dilated bowel loops in the visualized upper abdomen. Also correlate with findings on earlier performed abdomen/pelvis CT.    Postsurgical left hemithorax volume loss with small left pleural reaction. Sharp right CP angle. Clear remaining visualized lungs. No pneumothorax.    Stable cardiac and mediastinal silhouettes including post surgical left hilar distortion with overlying surgical clips.    Trachea midline.    Unremarkable osseous structures.      < end of copied text >         ASSESSMENT:  82 yo former smoker with lung ca s/p radiation therapy admitted with probable PNA    PLAN:  ID consult  IV antibiotics  sputum culture  symbicort BID  spiriva qhs  f/u cultures        Thank you for allowing me to participate in the care of this patient.  Please feel free to call me for any questions/concerns.      Salina Nice DO  [facility] Pulmonary/Sleep Medicine  [phone]

## 2022-07-26 NOTE — DIETITIAN INITIAL EVALUATION ADULT - PERSON TAUGHT/METHOD
verbal instruction/teach back - (Patient repeats in own words)/patient instructed/daughter instructed

## 2022-07-26 NOTE — DIETITIAN INITIAL EVALUATION ADULT - PERTINENT MEDS FT
MEDICATIONS  (STANDING):  azithromycin  IVPB      azithromycin  IVPB 500 milliGRAM(s) IV Intermittent every 24 hours  budesonide 160 MICROgram(s)/formoterol 4.5 MICROgram(s) Inhaler 2 Puff(s) Inhalation two times a day  chlorhexidine 2% Cloths 1 Application(s) Topical daily  dextrose 5%. 1000 milliLiter(s) (100 mL/Hr) IV Continuous <Continuous>  dextrose 5%. 1000 milliLiter(s) (50 mL/Hr) IV Continuous <Continuous>  dextrose 50% Injectable 25 Gram(s) IV Push once  dextrose 50% Injectable 12.5 Gram(s) IV Push once  dextrose 50% Injectable 25 Gram(s) IV Push once  glucagon  Injectable 1 milliGRAM(s) IntraMuscular once  heparin   Injectable 5000 Unit(s) SubCutaneous every 12 hours  hydrochlorothiazide 25 milliGRAM(s) Oral daily  insulin lispro (ADMELOG) corrective regimen sliding scale   SubCutaneous three times a day before meals  insulin lispro (ADMELOG) corrective regimen sliding scale   SubCutaneous at bedtime  metoprolol succinate ER 50 milliGRAM(s) Oral daily  NIFEdipine XL 30 milliGRAM(s) Oral daily  pantoprazole    Tablet 40 milliGRAM(s) Oral before breakfast  piperacillin/tazobactam IVPB.. 3.375 Gram(s) IV Intermittent every 8 hours  pregabalin 100 milliGRAM(s) Oral daily  tiotropium 18 MICROgram(s) Capsule 1 Capsule(s) Inhalation daily  valsartan 320 milliGRAM(s) Oral daily  vancomycin  IVPB 1000 milliGRAM(s) IV Intermittent every 12 hours    MEDICATIONS  (PRN):  ALBUTerol    90 MICROgram(s) HFA Inhaler 2 Puff(s) Inhalation every 6 hours PRN Shortness of Breath and/or Wheezing  dextrose Oral Gel 15 Gram(s) Oral once PRN Blood Glucose LESS THAN 70 milliGRAM(s)/deciliter

## 2022-07-27 LAB
ALBUMIN SERPL ELPH-MCNC: 2.7 G/DL — LOW (ref 3.3–5)
ALP SERPL-CCNC: 69 U/L — SIGNIFICANT CHANGE UP (ref 40–120)
ALT FLD-CCNC: 24 U/L — SIGNIFICANT CHANGE UP (ref 4–33)
ANION GAP SERPL CALC-SCNC: 9 MMOL/L — SIGNIFICANT CHANGE UP (ref 7–14)
APTT BLD: 31 SEC — SIGNIFICANT CHANGE UP (ref 27–36.3)
AST SERPL-CCNC: 21 U/L — SIGNIFICANT CHANGE UP (ref 4–32)
BASOPHILS # BLD AUTO: 0.02 K/UL — SIGNIFICANT CHANGE UP (ref 0–0.2)
BASOPHILS NFR BLD AUTO: 0.2 % — SIGNIFICANT CHANGE UP (ref 0–2)
BILIRUB SERPL-MCNC: 0.2 MG/DL — SIGNIFICANT CHANGE UP (ref 0.2–1.2)
BUN SERPL-MCNC: 15 MG/DL — SIGNIFICANT CHANGE UP (ref 7–23)
CALCIUM SERPL-MCNC: 9.1 MG/DL — SIGNIFICANT CHANGE UP (ref 8.4–10.5)
CHLORIDE SERPL-SCNC: 93 MMOL/L — LOW (ref 98–107)
CO2 SERPL-SCNC: 28 MMOL/L — SIGNIFICANT CHANGE UP (ref 22–31)
CREAT SERPL-MCNC: 1.12 MG/DL — SIGNIFICANT CHANGE UP (ref 0.5–1.3)
EGFR: 49 ML/MIN/1.73M2 — LOW
EOSINOPHIL # BLD AUTO: 0.08 K/UL — SIGNIFICANT CHANGE UP (ref 0–0.5)
EOSINOPHIL NFR BLD AUTO: 0.9 % — SIGNIFICANT CHANGE UP (ref 0–6)
GLUCOSE BLDC GLUCOMTR-MCNC: 209 MG/DL — HIGH (ref 70–99)
GLUCOSE BLDC GLUCOMTR-MCNC: 209 MG/DL — HIGH (ref 70–99)
GLUCOSE BLDC GLUCOMTR-MCNC: 210 MG/DL — HIGH (ref 70–99)
GLUCOSE BLDC GLUCOMTR-MCNC: 327 MG/DL — HIGH (ref 70–99)
GLUCOSE SERPL-MCNC: 197 MG/DL — HIGH (ref 70–99)
HCT VFR BLD CALC: 34.2 % — LOW (ref 34.5–45)
HGB BLD-MCNC: 11 G/DL — LOW (ref 11.5–15.5)
IANC: 5.73 K/UL — SIGNIFICANT CHANGE UP (ref 1.8–7.4)
IMM GRANULOCYTES NFR BLD AUTO: 0.7 % — SIGNIFICANT CHANGE UP (ref 0–1.5)
INR BLD: 1.09 RATIO — SIGNIFICANT CHANGE UP (ref 0.88–1.16)
LYMPHOCYTES # BLD AUTO: 1.74 K/UL — SIGNIFICANT CHANGE UP (ref 1–3.3)
LYMPHOCYTES # BLD AUTO: 20.4 % — SIGNIFICANT CHANGE UP (ref 13–44)
MAGNESIUM SERPL-MCNC: 1.5 MG/DL — LOW (ref 1.6–2.6)
MCHC RBC-ENTMCNC: 29 PG — SIGNIFICANT CHANGE UP (ref 27–34)
MCHC RBC-ENTMCNC: 32.2 GM/DL — SIGNIFICANT CHANGE UP (ref 32–36)
MCV RBC AUTO: 90.2 FL — SIGNIFICANT CHANGE UP (ref 80–100)
MONOCYTES # BLD AUTO: 0.88 K/UL — SIGNIFICANT CHANGE UP (ref 0–0.9)
MONOCYTES NFR BLD AUTO: 10.3 % — SIGNIFICANT CHANGE UP (ref 2–14)
NEUTROPHILS # BLD AUTO: 5.73 K/UL — SIGNIFICANT CHANGE UP (ref 1.8–7.4)
NEUTROPHILS NFR BLD AUTO: 67.5 % — SIGNIFICANT CHANGE UP (ref 43–77)
NRBC # BLD: 0 /100 WBCS — SIGNIFICANT CHANGE UP
NRBC # FLD: 0 K/UL — SIGNIFICANT CHANGE UP
PHOSPHATE SERPL-MCNC: 3.8 MG/DL — SIGNIFICANT CHANGE UP (ref 2.5–4.5)
PLATELET # BLD AUTO: 488 K/UL — HIGH (ref 150–400)
POTASSIUM SERPL-MCNC: 3.8 MMOL/L — SIGNIFICANT CHANGE UP (ref 3.5–5.3)
POTASSIUM SERPL-SCNC: 3.8 MMOL/L — SIGNIFICANT CHANGE UP (ref 3.5–5.3)
PROT SERPL-MCNC: 7.1 G/DL — SIGNIFICANT CHANGE UP (ref 6–8.3)
PROTHROM AB SERPL-ACNC: 12.7 SEC — SIGNIFICANT CHANGE UP (ref 10.5–13.4)
RBC # BLD: 3.79 M/UL — LOW (ref 3.8–5.2)
RBC # FLD: 14.7 % — HIGH (ref 10.3–14.5)
SODIUM SERPL-SCNC: 130 MMOL/L — LOW (ref 135–145)
WBC # BLD: 8.51 K/UL — SIGNIFICANT CHANGE UP (ref 3.8–10.5)
WBC # FLD AUTO: 8.51 K/UL — SIGNIFICANT CHANGE UP (ref 3.8–10.5)

## 2022-07-27 RX ORDER — MAGNESIUM OXIDE 400 MG ORAL TABLET 241.3 MG
400 TABLET ORAL ONCE
Refills: 0 | Status: COMPLETED | OUTPATIENT
Start: 2022-07-27 | End: 2022-07-27

## 2022-07-27 RX ADMIN — Medication 100 MILLIGRAM(S): at 12:01

## 2022-07-27 RX ADMIN — ALBUTEROL 2 PUFF(S): 90 AEROSOL, METERED ORAL at 18:39

## 2022-07-27 RX ADMIN — BUDESONIDE AND FORMOTEROL FUMARATE DIHYDRATE 2 PUFF(S): 160; 4.5 AEROSOL RESPIRATORY (INHALATION) at 08:50

## 2022-07-27 RX ADMIN — Medication 81 MILLIGRAM(S): at 11:59

## 2022-07-27 RX ADMIN — Medication 4: at 18:04

## 2022-07-27 RX ADMIN — HEPARIN SODIUM 5000 UNIT(S): 5000 INJECTION INTRAVENOUS; SUBCUTANEOUS at 18:07

## 2022-07-27 RX ADMIN — PIPERACILLIN AND TAZOBACTAM 25 GRAM(S): 4; .5 INJECTION, POWDER, LYOPHILIZED, FOR SOLUTION INTRAVENOUS at 03:33

## 2022-07-27 RX ADMIN — PIPERACILLIN AND TAZOBACTAM 25 GRAM(S): 4; .5 INJECTION, POWDER, LYOPHILIZED, FOR SOLUTION INTRAVENOUS at 21:53

## 2022-07-27 RX ADMIN — CHLORHEXIDINE GLUCONATE 1 APPLICATION(S): 213 SOLUTION TOPICAL at 11:59

## 2022-07-27 RX ADMIN — Medication 10 MILLIGRAM(S): at 07:08

## 2022-07-27 RX ADMIN — PANTOPRAZOLE SODIUM 40 MILLIGRAM(S): 20 TABLET, DELAYED RELEASE ORAL at 07:00

## 2022-07-27 RX ADMIN — Medication 100 MILLIGRAM(S): at 21:54

## 2022-07-27 RX ADMIN — Medication 2: at 12:44

## 2022-07-27 RX ADMIN — TIOTROPIUM BROMIDE 1 CAPSULE(S): 18 CAPSULE ORAL; RESPIRATORY (INHALATION) at 10:37

## 2022-07-27 RX ADMIN — Medication 2: at 08:50

## 2022-07-27 RX ADMIN — VALSARTAN 320 MILLIGRAM(S): 80 TABLET ORAL at 07:01

## 2022-07-27 RX ADMIN — Medication 100 MILLIGRAM(S): at 11:59

## 2022-07-27 RX ADMIN — Medication 25 MILLIGRAM(S): at 07:01

## 2022-07-27 RX ADMIN — PIPERACILLIN AND TAZOBACTAM 25 GRAM(S): 4; .5 INJECTION, POWDER, LYOPHILIZED, FOR SOLUTION INTRAVENOUS at 13:43

## 2022-07-27 RX ADMIN — Medication 30 MILLIGRAM(S): at 07:01

## 2022-07-27 RX ADMIN — MAGNESIUM OXIDE 400 MG ORAL TABLET 400 MILLIGRAM(S): 241.3 TABLET ORAL at 10:37

## 2022-07-27 RX ADMIN — HEPARIN SODIUM 5000 UNIT(S): 5000 INJECTION INTRAVENOUS; SUBCUTANEOUS at 06:55

## 2022-07-27 RX ADMIN — Medication 10 MILLIGRAM(S): at 18:09

## 2022-07-27 RX ADMIN — Medication 50 MILLIGRAM(S): at 07:00

## 2022-07-27 RX ADMIN — BUDESONIDE AND FORMOTEROL FUMARATE DIHYDRATE 2 PUFF(S): 160; 4.5 AEROSOL RESPIRATORY (INHALATION) at 21:53

## 2022-07-27 RX ADMIN — Medication 100 MILLIGRAM(S): at 18:06

## 2022-07-27 NOTE — PHYSICAL THERAPY INITIAL EVALUATION ADULT - ADDITIONAL COMMENTS
Pt. reports owning a quad cane and rollator.     Pt. was left in bed post PT Evaluation, no apparent distress, call bell within reach. Phoebe RUIZ made aware of pt. status and participation in PT.

## 2022-07-27 NOTE — PROVIDER CONTACT NOTE (MEDICATION) - ASSESSMENT
Pt IV access no working at 11:30 when originally hung, two nurses unable to get access, IV nurse placed new IV in left hand, thus dose delayed

## 2022-07-27 NOTE — PROVIDER CONTACT NOTE (MEDICATION) - DATE AND TIME:
Patient has been called and left 3 voicemails with no return call. Please advise.    27-Jul-2022 14:30

## 2022-07-28 LAB
ANION GAP SERPL CALC-SCNC: 11 MMOL/L — SIGNIFICANT CHANGE UP (ref 7–14)
BUN SERPL-MCNC: 11 MG/DL — SIGNIFICANT CHANGE UP (ref 7–23)
CALCIUM SERPL-MCNC: 9.1 MG/DL — SIGNIFICANT CHANGE UP (ref 8.4–10.5)
CHLORIDE SERPL-SCNC: 95 MMOL/L — LOW (ref 98–107)
CO2 SERPL-SCNC: 29 MMOL/L — SIGNIFICANT CHANGE UP (ref 22–31)
CREAT SERPL-MCNC: 1.01 MG/DL — SIGNIFICANT CHANGE UP (ref 0.5–1.3)
EGFR: 56 ML/MIN/1.73M2 — LOW
GLUCOSE BLDC GLUCOMTR-MCNC: 271 MG/DL — HIGH (ref 70–99)
GLUCOSE BLDC GLUCOMTR-MCNC: 300 MG/DL — HIGH (ref 70–99)
GLUCOSE BLDC GLUCOMTR-MCNC: 327 MG/DL — HIGH (ref 70–99)
GLUCOSE BLDC GLUCOMTR-MCNC: 374 MG/DL — HIGH (ref 70–99)
GLUCOSE SERPL-MCNC: 263 MG/DL — HIGH (ref 70–99)
GRAM STN FLD: SIGNIFICANT CHANGE UP
HCT VFR BLD CALC: 37.4 % — SIGNIFICANT CHANGE UP (ref 34.5–45)
HGB BLD-MCNC: 11.8 G/DL — SIGNIFICANT CHANGE UP (ref 11.5–15.5)
MAGNESIUM SERPL-MCNC: 1.5 MG/DL — LOW (ref 1.6–2.6)
MCHC RBC-ENTMCNC: 28.8 PG — SIGNIFICANT CHANGE UP (ref 27–34)
MCHC RBC-ENTMCNC: 31.6 GM/DL — LOW (ref 32–36)
MCV RBC AUTO: 91.2 FL — SIGNIFICANT CHANGE UP (ref 80–100)
NRBC # BLD: 0 /100 WBCS — SIGNIFICANT CHANGE UP
NRBC # FLD: 0 K/UL — SIGNIFICANT CHANGE UP
PHOSPHATE SERPL-MCNC: 2.6 MG/DL — SIGNIFICANT CHANGE UP (ref 2.5–4.5)
PLATELET # BLD AUTO: 521 K/UL — HIGH (ref 150–400)
POTASSIUM SERPL-MCNC: 4.1 MMOL/L — SIGNIFICANT CHANGE UP (ref 3.5–5.3)
POTASSIUM SERPL-SCNC: 4.1 MMOL/L — SIGNIFICANT CHANGE UP (ref 3.5–5.3)
RBC # BLD: 4.1 M/UL — SIGNIFICANT CHANGE UP (ref 3.8–5.2)
RBC # FLD: 14.6 % — HIGH (ref 10.3–14.5)
SODIUM SERPL-SCNC: 135 MMOL/L — SIGNIFICANT CHANGE UP (ref 135–145)
SPECIMEN SOURCE: SIGNIFICANT CHANGE UP
WBC # BLD: 6.38 K/UL — SIGNIFICANT CHANGE UP (ref 3.8–10.5)
WBC # FLD AUTO: 6.38 K/UL — SIGNIFICANT CHANGE UP (ref 3.8–10.5)

## 2022-07-28 RX ORDER — MAGNESIUM OXIDE 400 MG ORAL TABLET 241.3 MG
400 TABLET ORAL ONCE
Refills: 0 | Status: COMPLETED | OUTPATIENT
Start: 2022-07-28 | End: 2022-07-28

## 2022-07-28 RX ADMIN — MAGNESIUM OXIDE 400 MG ORAL TABLET 400 MILLIGRAM(S): 241.3 TABLET ORAL at 22:00

## 2022-07-28 RX ADMIN — HEPARIN SODIUM 5000 UNIT(S): 5000 INJECTION INTRAVENOUS; SUBCUTANEOUS at 06:52

## 2022-07-28 RX ADMIN — Medication 5: at 13:01

## 2022-07-28 RX ADMIN — Medication 4: at 09:00

## 2022-07-28 RX ADMIN — Medication 10 MILLIGRAM(S): at 06:52

## 2022-07-28 RX ADMIN — Medication 10 MILLIGRAM(S): at 18:08

## 2022-07-28 RX ADMIN — Medication 100 MILLIGRAM(S): at 14:05

## 2022-07-28 RX ADMIN — CHLORHEXIDINE GLUCONATE 1 APPLICATION(S): 213 SOLUTION TOPICAL at 11:51

## 2022-07-28 RX ADMIN — Medication 1: at 22:15

## 2022-07-28 RX ADMIN — Medication 100 MILLIGRAM(S): at 11:49

## 2022-07-28 RX ADMIN — Medication 30 MILLIGRAM(S): at 06:52

## 2022-07-28 RX ADMIN — PIPERACILLIN AND TAZOBACTAM 25 GRAM(S): 4; .5 INJECTION, POWDER, LYOPHILIZED, FOR SOLUTION INTRAVENOUS at 06:51

## 2022-07-28 RX ADMIN — Medication 81 MILLIGRAM(S): at 11:49

## 2022-07-28 RX ADMIN — PIPERACILLIN AND TAZOBACTAM 25 GRAM(S): 4; .5 INJECTION, POWDER, LYOPHILIZED, FOR SOLUTION INTRAVENOUS at 14:04

## 2022-07-28 RX ADMIN — Medication 100 MILLIGRAM(S): at 11:48

## 2022-07-28 RX ADMIN — PANTOPRAZOLE SODIUM 40 MILLIGRAM(S): 20 TABLET, DELAYED RELEASE ORAL at 06:52

## 2022-07-28 RX ADMIN — Medication 3: at 18:05

## 2022-07-28 RX ADMIN — PIPERACILLIN AND TAZOBACTAM 25 GRAM(S): 4; .5 INJECTION, POWDER, LYOPHILIZED, FOR SOLUTION INTRAVENOUS at 22:01

## 2022-07-28 RX ADMIN — VALSARTAN 320 MILLIGRAM(S): 80 TABLET ORAL at 06:51

## 2022-07-28 RX ADMIN — TIOTROPIUM BROMIDE 1 CAPSULE(S): 18 CAPSULE ORAL; RESPIRATORY (INHALATION) at 09:03

## 2022-07-28 RX ADMIN — Medication 100 MILLIGRAM(S): at 14:04

## 2022-07-28 RX ADMIN — Medication 25 MILLIGRAM(S): at 06:52

## 2022-07-28 RX ADMIN — BUDESONIDE AND FORMOTEROL FUMARATE DIHYDRATE 2 PUFF(S): 160; 4.5 AEROSOL RESPIRATORY (INHALATION) at 09:01

## 2022-07-28 RX ADMIN — Medication 50 MILLIGRAM(S): at 06:51

## 2022-07-28 RX ADMIN — HEPARIN SODIUM 5000 UNIT(S): 5000 INJECTION INTRAVENOUS; SUBCUTANEOUS at 18:06

## 2022-07-28 RX ADMIN — BUDESONIDE AND FORMOTEROL FUMARATE DIHYDRATE 2 PUFF(S): 160; 4.5 AEROSOL RESPIRATORY (INHALATION) at 22:01

## 2022-07-29 LAB
ALBUMIN SERPL ELPH-MCNC: 2.7 G/DL — LOW (ref 3.3–5)
ALP SERPL-CCNC: 66 U/L — SIGNIFICANT CHANGE UP (ref 40–120)
ALT FLD-CCNC: 14 U/L — SIGNIFICANT CHANGE UP (ref 4–33)
ANION GAP SERPL CALC-SCNC: 8 MMOL/L — SIGNIFICANT CHANGE UP (ref 7–14)
AST SERPL-CCNC: 18 U/L — SIGNIFICANT CHANGE UP (ref 4–32)
BILIRUB SERPL-MCNC: <0.2 MG/DL — SIGNIFICANT CHANGE UP (ref 0.2–1.2)
BUN SERPL-MCNC: 9 MG/DL — SIGNIFICANT CHANGE UP (ref 7–23)
CALCIUM SERPL-MCNC: 8.8 MG/DL — SIGNIFICANT CHANGE UP (ref 8.4–10.5)
CHLORIDE SERPL-SCNC: 94 MMOL/L — LOW (ref 98–107)
CO2 SERPL-SCNC: 31 MMOL/L — SIGNIFICANT CHANGE UP (ref 22–31)
CREAT SERPL-MCNC: 0.96 MG/DL — SIGNIFICANT CHANGE UP (ref 0.5–1.3)
CULTURE RESULTS: SIGNIFICANT CHANGE UP
EGFR: 59 ML/MIN/1.73M2 — LOW
GLUCOSE BLDC GLUCOMTR-MCNC: 202 MG/DL — HIGH (ref 70–99)
GLUCOSE BLDC GLUCOMTR-MCNC: 225 MG/DL — HIGH (ref 70–99)
GLUCOSE BLDC GLUCOMTR-MCNC: 280 MG/DL — HIGH (ref 70–99)
GLUCOSE BLDC GLUCOMTR-MCNC: 331 MG/DL — HIGH (ref 70–99)
GLUCOSE SERPL-MCNC: 265 MG/DL — HIGH (ref 70–99)
HCT VFR BLD CALC: 34 % — LOW (ref 34.5–45)
HGB BLD-MCNC: 10.6 G/DL — LOW (ref 11.5–15.5)
MAGNESIUM SERPL-MCNC: 1.4 MG/DL — LOW (ref 1.6–2.6)
MCHC RBC-ENTMCNC: 28.9 PG — SIGNIFICANT CHANGE UP (ref 27–34)
MCHC RBC-ENTMCNC: 31.2 GM/DL — LOW (ref 32–36)
MCV RBC AUTO: 92.6 FL — SIGNIFICANT CHANGE UP (ref 80–100)
NRBC # BLD: 0 /100 WBCS — SIGNIFICANT CHANGE UP
NRBC # FLD: 0 K/UL — SIGNIFICANT CHANGE UP
PHOSPHATE SERPL-MCNC: 2.5 MG/DL — SIGNIFICANT CHANGE UP (ref 2.5–4.5)
PLATELET # BLD AUTO: 474 K/UL — HIGH (ref 150–400)
POTASSIUM SERPL-MCNC: 3.6 MMOL/L — SIGNIFICANT CHANGE UP (ref 3.5–5.3)
POTASSIUM SERPL-SCNC: 3.6 MMOL/L — SIGNIFICANT CHANGE UP (ref 3.5–5.3)
PROT SERPL-MCNC: 6.8 G/DL — SIGNIFICANT CHANGE UP (ref 6–8.3)
RBC # BLD: 3.67 M/UL — LOW (ref 3.8–5.2)
RBC # FLD: 14.5 % — SIGNIFICANT CHANGE UP (ref 10.3–14.5)
SODIUM SERPL-SCNC: 133 MMOL/L — LOW (ref 135–145)
SPECIMEN SOURCE: SIGNIFICANT CHANGE UP
WBC # BLD: 5.67 K/UL — SIGNIFICANT CHANGE UP (ref 3.8–10.5)
WBC # FLD AUTO: 5.67 K/UL — SIGNIFICANT CHANGE UP (ref 3.8–10.5)

## 2022-07-29 RX ORDER — MAGNESIUM SULFATE 500 MG/ML
1 VIAL (ML) INJECTION ONCE
Refills: 0 | Status: COMPLETED | OUTPATIENT
Start: 2022-07-29 | End: 2022-07-29

## 2022-07-29 RX ADMIN — Medication 100 MILLIGRAM(S): at 12:23

## 2022-07-29 RX ADMIN — Medication 2: at 22:14

## 2022-07-29 RX ADMIN — Medication 600 MILLIGRAM(S): at 19:17

## 2022-07-29 RX ADMIN — Medication 100 MILLIGRAM(S): at 12:24

## 2022-07-29 RX ADMIN — Medication 10 MILLIGRAM(S): at 05:06

## 2022-07-29 RX ADMIN — PIPERACILLIN AND TAZOBACTAM 25 GRAM(S): 4; .5 INJECTION, POWDER, LYOPHILIZED, FOR SOLUTION INTRAVENOUS at 22:14

## 2022-07-29 RX ADMIN — Medication 25 MILLIGRAM(S): at 05:05

## 2022-07-29 RX ADMIN — BUDESONIDE AND FORMOTEROL FUMARATE DIHYDRATE 2 PUFF(S): 160; 4.5 AEROSOL RESPIRATORY (INHALATION) at 22:12

## 2022-07-29 RX ADMIN — ALBUTEROL 2 PUFF(S): 90 AEROSOL, METERED ORAL at 05:03

## 2022-07-29 RX ADMIN — Medication 50 MILLIGRAM(S): at 05:05

## 2022-07-29 RX ADMIN — PANTOPRAZOLE SODIUM 40 MILLIGRAM(S): 20 TABLET, DELAYED RELEASE ORAL at 05:04

## 2022-07-29 RX ADMIN — Medication 2: at 12:24

## 2022-07-29 RX ADMIN — TIOTROPIUM BROMIDE 1 CAPSULE(S): 18 CAPSULE ORAL; RESPIRATORY (INHALATION) at 09:00

## 2022-07-29 RX ADMIN — Medication 3: at 08:55

## 2022-07-29 RX ADMIN — PIPERACILLIN AND TAZOBACTAM 25 GRAM(S): 4; .5 INJECTION, POWDER, LYOPHILIZED, FOR SOLUTION INTRAVENOUS at 12:25

## 2022-07-29 RX ADMIN — PIPERACILLIN AND TAZOBACTAM 25 GRAM(S): 4; .5 INJECTION, POWDER, LYOPHILIZED, FOR SOLUTION INTRAVENOUS at 05:04

## 2022-07-29 RX ADMIN — HEPARIN SODIUM 5000 UNIT(S): 5000 INJECTION INTRAVENOUS; SUBCUTANEOUS at 18:40

## 2022-07-29 RX ADMIN — Medication 100 MILLIGRAM(S): at 05:03

## 2022-07-29 RX ADMIN — VALSARTAN 320 MILLIGRAM(S): 80 TABLET ORAL at 05:05

## 2022-07-29 RX ADMIN — Medication 100 MILLIGRAM(S): at 08:55

## 2022-07-29 RX ADMIN — BUDESONIDE AND FORMOTEROL FUMARATE DIHYDRATE 2 PUFF(S): 160; 4.5 AEROSOL RESPIRATORY (INHALATION) at 08:55

## 2022-07-29 RX ADMIN — HEPARIN SODIUM 5000 UNIT(S): 5000 INJECTION INTRAVENOUS; SUBCUTANEOUS at 05:05

## 2022-07-29 RX ADMIN — Medication 100 MILLIGRAM(S): at 22:14

## 2022-07-29 RX ADMIN — Medication 10 MILLIGRAM(S): at 18:41

## 2022-07-29 RX ADMIN — Medication 30 MILLIGRAM(S): at 05:05

## 2022-07-29 RX ADMIN — CHLORHEXIDINE GLUCONATE 1 APPLICATION(S): 213 SOLUTION TOPICAL at 12:28

## 2022-07-29 RX ADMIN — Medication 2: at 18:19

## 2022-07-29 RX ADMIN — Medication 81 MILLIGRAM(S): at 12:24

## 2022-07-29 RX ADMIN — Medication 100 GRAM(S): at 10:24

## 2022-07-30 LAB
ALBUMIN SERPL ELPH-MCNC: 2.7 G/DL — LOW (ref 3.3–5)
ALP SERPL-CCNC: 80 U/L — SIGNIFICANT CHANGE UP (ref 40–120)
ALT FLD-CCNC: 26 U/L — SIGNIFICANT CHANGE UP (ref 4–33)
ANION GAP SERPL CALC-SCNC: 13 MMOL/L — SIGNIFICANT CHANGE UP (ref 7–14)
AST SERPL-CCNC: 36 U/L — HIGH (ref 4–32)
BILIRUB SERPL-MCNC: <0.2 MG/DL — SIGNIFICANT CHANGE UP (ref 0.2–1.2)
BUN SERPL-MCNC: 12 MG/DL — SIGNIFICANT CHANGE UP (ref 7–23)
C DIFF BY PCR RESULT: SIGNIFICANT CHANGE UP
C DIFF TOX GENS STL QL NAA+PROBE: SIGNIFICANT CHANGE UP
CALCIUM SERPL-MCNC: 8.6 MG/DL — SIGNIFICANT CHANGE UP (ref 8.4–10.5)
CHLORIDE SERPL-SCNC: 93 MMOL/L — LOW (ref 98–107)
CO2 SERPL-SCNC: 27 MMOL/L — SIGNIFICANT CHANGE UP (ref 22–31)
CREAT SERPL-MCNC: 1.07 MG/DL — SIGNIFICANT CHANGE UP (ref 0.5–1.3)
CULTURE RESULTS: SIGNIFICANT CHANGE UP
EGFR: 52 ML/MIN/1.73M2 — LOW
GLUCOSE BLDC GLUCOMTR-MCNC: 154 MG/DL — HIGH (ref 70–99)
GLUCOSE BLDC GLUCOMTR-MCNC: 183 MG/DL — HIGH (ref 70–99)
GLUCOSE BLDC GLUCOMTR-MCNC: 349 MG/DL — HIGH (ref 70–99)
GLUCOSE BLDC GLUCOMTR-MCNC: 426 MG/DL — HIGH (ref 70–99)
GLUCOSE BLDC GLUCOMTR-MCNC: 430 MG/DL — HIGH (ref 70–99)
GLUCOSE SERPL-MCNC: 384 MG/DL — HIGH (ref 70–99)
HCT VFR BLD CALC: 32.8 % — LOW (ref 34.5–45)
HGB BLD-MCNC: 10.2 G/DL — LOW (ref 11.5–15.5)
MAGNESIUM SERPL-MCNC: 1.6 MG/DL — SIGNIFICANT CHANGE UP (ref 1.6–2.6)
MCHC RBC-ENTMCNC: 28.4 PG — SIGNIFICANT CHANGE UP (ref 27–34)
MCHC RBC-ENTMCNC: 31.1 GM/DL — LOW (ref 32–36)
MCV RBC AUTO: 91.4 FL — SIGNIFICANT CHANGE UP (ref 80–100)
NRBC # BLD: 0 /100 WBCS — SIGNIFICANT CHANGE UP
NRBC # FLD: 0 K/UL — SIGNIFICANT CHANGE UP
PHOSPHATE SERPL-MCNC: 2.5 MG/DL — SIGNIFICANT CHANGE UP (ref 2.5–4.5)
PLATELET # BLD AUTO: 459 K/UL — HIGH (ref 150–400)
POTASSIUM SERPL-MCNC: 3.6 MMOL/L — SIGNIFICANT CHANGE UP (ref 3.5–5.3)
POTASSIUM SERPL-SCNC: 3.6 MMOL/L — SIGNIFICANT CHANGE UP (ref 3.5–5.3)
PROT SERPL-MCNC: 6.8 G/DL — SIGNIFICANT CHANGE UP (ref 6–8.3)
RBC # BLD: 3.59 M/UL — LOW (ref 3.8–5.2)
RBC # FLD: 14.6 % — HIGH (ref 10.3–14.5)
SODIUM SERPL-SCNC: 133 MMOL/L — LOW (ref 135–145)
SPECIMEN SOURCE: SIGNIFICANT CHANGE UP
WBC # BLD: 6.01 K/UL — SIGNIFICANT CHANGE UP (ref 3.8–10.5)
WBC # FLD AUTO: 6.01 K/UL — SIGNIFICANT CHANGE UP (ref 3.8–10.5)

## 2022-07-30 PROCEDURE — 99232 SBSQ HOSP IP/OBS MODERATE 35: CPT

## 2022-07-30 RX ADMIN — BUDESONIDE AND FORMOTEROL FUMARATE DIHYDRATE 2 PUFF(S): 160; 4.5 AEROSOL RESPIRATORY (INHALATION) at 22:25

## 2022-07-30 RX ADMIN — BUDESONIDE AND FORMOTEROL FUMARATE DIHYDRATE 2 PUFF(S): 160; 4.5 AEROSOL RESPIRATORY (INHALATION) at 08:39

## 2022-07-30 RX ADMIN — PANTOPRAZOLE SODIUM 40 MILLIGRAM(S): 20 TABLET, DELAYED RELEASE ORAL at 05:26

## 2022-07-30 RX ADMIN — PIPERACILLIN AND TAZOBACTAM 25 GRAM(S): 4; .5 INJECTION, POWDER, LYOPHILIZED, FOR SOLUTION INTRAVENOUS at 22:25

## 2022-07-30 RX ADMIN — HEPARIN SODIUM 5000 UNIT(S): 5000 INJECTION INTRAVENOUS; SUBCUTANEOUS at 17:37

## 2022-07-30 RX ADMIN — Medication 4: at 12:13

## 2022-07-30 RX ADMIN — Medication 10 MILLIGRAM(S): at 05:26

## 2022-07-30 RX ADMIN — HEPARIN SODIUM 5000 UNIT(S): 5000 INJECTION INTRAVENOUS; SUBCUTANEOUS at 05:25

## 2022-07-30 RX ADMIN — Medication 100 MILLIGRAM(S): at 19:22

## 2022-07-30 RX ADMIN — Medication 10 MILLIGRAM(S): at 17:37

## 2022-07-30 RX ADMIN — PIPERACILLIN AND TAZOBACTAM 25 GRAM(S): 4; .5 INJECTION, POWDER, LYOPHILIZED, FOR SOLUTION INTRAVENOUS at 12:11

## 2022-07-30 RX ADMIN — Medication 100 MILLIGRAM(S): at 12:12

## 2022-07-30 RX ADMIN — PIPERACILLIN AND TAZOBACTAM 25 GRAM(S): 4; .5 INJECTION, POWDER, LYOPHILIZED, FOR SOLUTION INTRAVENOUS at 05:14

## 2022-07-30 RX ADMIN — Medication 81 MILLIGRAM(S): at 12:12

## 2022-07-30 RX ADMIN — Medication 30 MILLIGRAM(S): at 05:26

## 2022-07-30 RX ADMIN — Medication 6: at 08:34

## 2022-07-30 RX ADMIN — TIOTROPIUM BROMIDE 1 CAPSULE(S): 18 CAPSULE ORAL; RESPIRATORY (INHALATION) at 08:39

## 2022-07-30 RX ADMIN — Medication 25 MILLIGRAM(S): at 05:26

## 2022-07-30 RX ADMIN — CHLORHEXIDINE GLUCONATE 1 APPLICATION(S): 213 SOLUTION TOPICAL at 12:20

## 2022-07-30 RX ADMIN — VALSARTAN 320 MILLIGRAM(S): 80 TABLET ORAL at 06:12

## 2022-07-30 RX ADMIN — Medication 1: at 17:37

## 2022-07-30 RX ADMIN — Medication 50 MILLIGRAM(S): at 05:26

## 2022-07-31 LAB
ALBUMIN SERPL ELPH-MCNC: 3.1 G/DL — LOW (ref 3.3–5)
ALP SERPL-CCNC: 66 U/L — SIGNIFICANT CHANGE UP (ref 40–120)
ALT FLD-CCNC: 32 U/L — SIGNIFICANT CHANGE UP (ref 4–33)
ANION GAP SERPL CALC-SCNC: 12 MMOL/L — SIGNIFICANT CHANGE UP (ref 7–14)
AST SERPL-CCNC: 42 U/L — HIGH (ref 4–32)
BILIRUB SERPL-MCNC: <0.2 MG/DL — SIGNIFICANT CHANGE UP (ref 0.2–1.2)
BUN SERPL-MCNC: 11 MG/DL — SIGNIFICANT CHANGE UP (ref 7–23)
CALCIUM SERPL-MCNC: 9.3 MG/DL — SIGNIFICANT CHANGE UP (ref 8.4–10.5)
CHLORIDE SERPL-SCNC: 95 MMOL/L — LOW (ref 98–107)
CO2 SERPL-SCNC: 29 MMOL/L — SIGNIFICANT CHANGE UP (ref 22–31)
CREAT SERPL-MCNC: 1 MG/DL — SIGNIFICANT CHANGE UP (ref 0.5–1.3)
CULTURE RESULTS: SIGNIFICANT CHANGE UP
EGFR: 56 ML/MIN/1.73M2 — LOW
GLUCOSE BLDC GLUCOMTR-MCNC: 172 MG/DL — HIGH (ref 70–99)
GLUCOSE BLDC GLUCOMTR-MCNC: 190 MG/DL — HIGH (ref 70–99)
GLUCOSE BLDC GLUCOMTR-MCNC: 214 MG/DL — HIGH (ref 70–99)
GLUCOSE BLDC GLUCOMTR-MCNC: 301 MG/DL — HIGH (ref 70–99)
GLUCOSE SERPL-MCNC: 235 MG/DL — HIGH (ref 70–99)
HCT VFR BLD CALC: 34.1 % — LOW (ref 34.5–45)
HGB BLD-MCNC: 11.1 G/DL — LOW (ref 11.5–15.5)
MAGNESIUM SERPL-MCNC: 1.6 MG/DL — SIGNIFICANT CHANGE UP (ref 1.6–2.6)
MCHC RBC-ENTMCNC: 29.1 PG — SIGNIFICANT CHANGE UP (ref 27–34)
MCHC RBC-ENTMCNC: 32.6 GM/DL — SIGNIFICANT CHANGE UP (ref 32–36)
MCV RBC AUTO: 89.5 FL — SIGNIFICANT CHANGE UP (ref 80–100)
NRBC # BLD: 0 /100 WBCS — SIGNIFICANT CHANGE UP
NRBC # FLD: 0 K/UL — SIGNIFICANT CHANGE UP
PHOSPHATE SERPL-MCNC: 2.6 MG/DL — SIGNIFICANT CHANGE UP (ref 2.5–4.5)
PLATELET # BLD AUTO: 454 K/UL — HIGH (ref 150–400)
POTASSIUM SERPL-MCNC: 3.9 MMOL/L — SIGNIFICANT CHANGE UP (ref 3.5–5.3)
POTASSIUM SERPL-SCNC: 3.9 MMOL/L — SIGNIFICANT CHANGE UP (ref 3.5–5.3)
PROT SERPL-MCNC: 7.2 G/DL — SIGNIFICANT CHANGE UP (ref 6–8.3)
RBC # BLD: 3.81 M/UL — SIGNIFICANT CHANGE UP (ref 3.8–5.2)
RBC # FLD: 14.9 % — HIGH (ref 10.3–14.5)
SODIUM SERPL-SCNC: 136 MMOL/L — SIGNIFICANT CHANGE UP (ref 135–145)
SPECIMEN SOURCE: SIGNIFICANT CHANGE UP
WBC # BLD: 6.33 K/UL — SIGNIFICANT CHANGE UP (ref 3.8–10.5)
WBC # FLD AUTO: 6.33 K/UL — SIGNIFICANT CHANGE UP (ref 3.8–10.5)

## 2022-07-31 RX ORDER — INSULIN GLARGINE 100 [IU]/ML
8 INJECTION, SOLUTION SUBCUTANEOUS AT BEDTIME
Refills: 0 | Status: DISCONTINUED | OUTPATIENT
Start: 2022-07-31 | End: 2022-08-01

## 2022-07-31 RX ORDER — LACTOBACILLUS ACIDOPHILUS 100MM CELL
1 CAPSULE ORAL THREE TIMES A DAY
Refills: 0 | Status: DISCONTINUED | OUTPATIENT
Start: 2022-07-31 | End: 2022-08-02

## 2022-07-31 RX ADMIN — Medication 50 MILLIGRAM(S): at 05:10

## 2022-07-31 RX ADMIN — HEPARIN SODIUM 5000 UNIT(S): 5000 INJECTION INTRAVENOUS; SUBCUTANEOUS at 17:36

## 2022-07-31 RX ADMIN — Medication 1: at 12:43

## 2022-07-31 RX ADMIN — Medication 30 MILLIGRAM(S): at 05:10

## 2022-07-31 RX ADMIN — Medication 100 MILLIGRAM(S): at 05:11

## 2022-07-31 RX ADMIN — Medication 2: at 17:36

## 2022-07-31 RX ADMIN — Medication 81 MILLIGRAM(S): at 12:42

## 2022-07-31 RX ADMIN — Medication 100 MILLIGRAM(S): at 22:31

## 2022-07-31 RX ADMIN — Medication 25 MILLIGRAM(S): at 05:11

## 2022-07-31 RX ADMIN — Medication 10 MILLIGRAM(S): at 05:11

## 2022-07-31 RX ADMIN — Medication 100 MILLIGRAM(S): at 12:43

## 2022-07-31 RX ADMIN — BUDESONIDE AND FORMOTEROL FUMARATE DIHYDRATE 2 PUFF(S): 160; 4.5 AEROSOL RESPIRATORY (INHALATION) at 08:42

## 2022-07-31 RX ADMIN — CHLORHEXIDINE GLUCONATE 1 APPLICATION(S): 213 SOLUTION TOPICAL at 12:43

## 2022-07-31 RX ADMIN — VALSARTAN 320 MILLIGRAM(S): 80 TABLET ORAL at 05:10

## 2022-07-31 RX ADMIN — PANTOPRAZOLE SODIUM 40 MILLIGRAM(S): 20 TABLET, DELAYED RELEASE ORAL at 05:10

## 2022-07-31 RX ADMIN — HEPARIN SODIUM 5000 UNIT(S): 5000 INJECTION INTRAVENOUS; SUBCUTANEOUS at 05:10

## 2022-07-31 RX ADMIN — Medication 100 MILLIGRAM(S): at 12:42

## 2022-07-31 RX ADMIN — PIPERACILLIN AND TAZOBACTAM 25 GRAM(S): 4; .5 INJECTION, POWDER, LYOPHILIZED, FOR SOLUTION INTRAVENOUS at 05:10

## 2022-07-31 RX ADMIN — Medication 100 MILLIGRAM(S): at 14:03

## 2022-07-31 RX ADMIN — PIPERACILLIN AND TAZOBACTAM 25 GRAM(S): 4; .5 INJECTION, POWDER, LYOPHILIZED, FOR SOLUTION INTRAVENOUS at 14:00

## 2022-07-31 RX ADMIN — Medication 4: at 08:43

## 2022-07-31 RX ADMIN — Medication 10 MILLIGRAM(S): at 17:36

## 2022-07-31 RX ADMIN — PIPERACILLIN AND TAZOBACTAM 25 GRAM(S): 4; .5 INJECTION, POWDER, LYOPHILIZED, FOR SOLUTION INTRAVENOUS at 22:31

## 2022-07-31 RX ADMIN — INSULIN GLARGINE 8 UNIT(S): 100 INJECTION, SOLUTION SUBCUTANEOUS at 22:33

## 2022-07-31 RX ADMIN — Medication 1 TABLET(S): at 14:00

## 2022-07-31 RX ADMIN — TIOTROPIUM BROMIDE 1 CAPSULE(S): 18 CAPSULE ORAL; RESPIRATORY (INHALATION) at 08:42

## 2022-07-31 RX ADMIN — BUDESONIDE AND FORMOTEROL FUMARATE DIHYDRATE 2 PUFF(S): 160; 4.5 AEROSOL RESPIRATORY (INHALATION) at 22:31

## 2022-07-31 RX ADMIN — Medication 1 TABLET(S): at 22:31

## 2022-08-01 LAB
ALBUMIN SERPL ELPH-MCNC: 3 G/DL — LOW (ref 3.3–5)
ALP SERPL-CCNC: 81 U/L — SIGNIFICANT CHANGE UP (ref 40–120)
ALT FLD-CCNC: 33 U/L — SIGNIFICANT CHANGE UP (ref 4–33)
ANION GAP SERPL CALC-SCNC: 13 MMOL/L — SIGNIFICANT CHANGE UP (ref 7–14)
AST SERPL-CCNC: 37 U/L — HIGH (ref 4–32)
BILIRUB SERPL-MCNC: <0.2 MG/DL — SIGNIFICANT CHANGE UP (ref 0.2–1.2)
BUN SERPL-MCNC: 16 MG/DL — SIGNIFICANT CHANGE UP (ref 7–23)
CALCIUM SERPL-MCNC: 9.2 MG/DL — SIGNIFICANT CHANGE UP (ref 8.4–10.5)
CHLORIDE SERPL-SCNC: 94 MMOL/L — LOW (ref 98–107)
CO2 SERPL-SCNC: 27 MMOL/L — SIGNIFICANT CHANGE UP (ref 22–31)
CREAT SERPL-MCNC: 1.12 MG/DL — SIGNIFICANT CHANGE UP (ref 0.5–1.3)
EGFR: 49 ML/MIN/1.73M2 — LOW
GLUCOSE BLDC GLUCOMTR-MCNC: 222 MG/DL — HIGH (ref 70–99)
GLUCOSE BLDC GLUCOMTR-MCNC: 284 MG/DL — HIGH (ref 70–99)
GLUCOSE BLDC GLUCOMTR-MCNC: 296 MG/DL — HIGH (ref 70–99)
GLUCOSE BLDC GLUCOMTR-MCNC: 308 MG/DL — HIGH (ref 70–99)
GLUCOSE SERPL-MCNC: 280 MG/DL — HIGH (ref 70–99)
HCT VFR BLD CALC: 35.2 % — SIGNIFICANT CHANGE UP (ref 34.5–45)
HGB BLD-MCNC: 11 G/DL — LOW (ref 11.5–15.5)
MAGNESIUM SERPL-MCNC: 1.6 MG/DL — SIGNIFICANT CHANGE UP (ref 1.6–2.6)
MCHC RBC-ENTMCNC: 28.6 PG — SIGNIFICANT CHANGE UP (ref 27–34)
MCHC RBC-ENTMCNC: 31.3 GM/DL — LOW (ref 32–36)
MCV RBC AUTO: 91.4 FL — SIGNIFICANT CHANGE UP (ref 80–100)
NRBC # BLD: 0 /100 WBCS — SIGNIFICANT CHANGE UP
NRBC # FLD: 0 K/UL — SIGNIFICANT CHANGE UP
PHOSPHATE SERPL-MCNC: 2.2 MG/DL — LOW (ref 2.5–4.5)
PLATELET # BLD AUTO: 447 K/UL — HIGH (ref 150–400)
POTASSIUM SERPL-MCNC: 4.3 MMOL/L — SIGNIFICANT CHANGE UP (ref 3.5–5.3)
POTASSIUM SERPL-SCNC: 4.3 MMOL/L — SIGNIFICANT CHANGE UP (ref 3.5–5.3)
PROT SERPL-MCNC: 7.7 G/DL — SIGNIFICANT CHANGE UP (ref 6–8.3)
RBC # BLD: 3.85 M/UL — SIGNIFICANT CHANGE UP (ref 3.8–5.2)
RBC # FLD: 15.3 % — HIGH (ref 10.3–14.5)
SARS-COV-2 RNA SPEC QL NAA+PROBE: SIGNIFICANT CHANGE UP
SODIUM SERPL-SCNC: 134 MMOL/L — LOW (ref 135–145)
WBC # BLD: 5.78 K/UL — SIGNIFICANT CHANGE UP (ref 3.8–10.5)
WBC # FLD AUTO: 5.78 K/UL — SIGNIFICANT CHANGE UP (ref 3.8–10.5)

## 2022-08-01 PROCEDURE — 74176 CT ABD & PELVIS W/O CONTRAST: CPT | Mod: 26

## 2022-08-01 RX ORDER — INSULIN GLARGINE 100 [IU]/ML
10 INJECTION, SOLUTION SUBCUTANEOUS AT BEDTIME
Refills: 0 | Status: DISCONTINUED | OUTPATIENT
Start: 2022-08-01 | End: 2022-08-02

## 2022-08-01 RX ORDER — LOPERAMIDE HCL 2 MG
2 TABLET ORAL EVERY 6 HOURS
Refills: 0 | Status: DISCONTINUED | OUTPATIENT
Start: 2022-08-01 | End: 2022-08-02

## 2022-08-01 RX ORDER — LOPERAMIDE HCL 2 MG
4 TABLET ORAL ONCE
Refills: 0 | Status: COMPLETED | OUTPATIENT
Start: 2022-08-01 | End: 2022-08-01

## 2022-08-01 RX ADMIN — PIPERACILLIN AND TAZOBACTAM 25 GRAM(S): 4; .5 INJECTION, POWDER, LYOPHILIZED, FOR SOLUTION INTRAVENOUS at 12:38

## 2022-08-01 RX ADMIN — Medication 100 MILLIGRAM(S): at 12:39

## 2022-08-01 RX ADMIN — Medication 10 MILLIGRAM(S): at 05:57

## 2022-08-01 RX ADMIN — Medication 63.75 MILLIMOLE(S): at 12:40

## 2022-08-01 RX ADMIN — Medication 30 MILLIGRAM(S): at 05:57

## 2022-08-01 RX ADMIN — Medication 50 MILLIGRAM(S): at 05:56

## 2022-08-01 RX ADMIN — Medication 1 TABLET(S): at 18:18

## 2022-08-01 RX ADMIN — HEPARIN SODIUM 5000 UNIT(S): 5000 INJECTION INTRAVENOUS; SUBCUTANEOUS at 18:18

## 2022-08-01 RX ADMIN — VALSARTAN 320 MILLIGRAM(S): 80 TABLET ORAL at 05:57

## 2022-08-01 RX ADMIN — PANTOPRAZOLE SODIUM 40 MILLIGRAM(S): 20 TABLET, DELAYED RELEASE ORAL at 05:57

## 2022-08-01 RX ADMIN — Medication 4 MILLIGRAM(S): at 18:25

## 2022-08-01 RX ADMIN — Medication 1 TABLET(S): at 05:58

## 2022-08-01 RX ADMIN — PIPERACILLIN AND TAZOBACTAM 25 GRAM(S): 4; .5 INJECTION, POWDER, LYOPHILIZED, FOR SOLUTION INTRAVENOUS at 05:59

## 2022-08-01 RX ADMIN — CHLORHEXIDINE GLUCONATE 1 APPLICATION(S): 213 SOLUTION TOPICAL at 12:46

## 2022-08-01 RX ADMIN — Medication 25 MILLIGRAM(S): at 05:57

## 2022-08-01 RX ADMIN — Medication 10 MILLIGRAM(S): at 18:19

## 2022-08-01 RX ADMIN — INSULIN GLARGINE 10 UNIT(S): 100 INJECTION, SOLUTION SUBCUTANEOUS at 22:40

## 2022-08-01 RX ADMIN — Medication 3: at 12:40

## 2022-08-01 RX ADMIN — TIOTROPIUM BROMIDE 1 CAPSULE(S): 18 CAPSULE ORAL; RESPIRATORY (INHALATION) at 08:55

## 2022-08-01 RX ADMIN — Medication 81 MILLIGRAM(S): at 12:40

## 2022-08-01 RX ADMIN — Medication 100 MILLIGRAM(S): at 12:40

## 2022-08-01 RX ADMIN — PIPERACILLIN AND TAZOBACTAM 25 GRAM(S): 4; .5 INJECTION, POWDER, LYOPHILIZED, FOR SOLUTION INTRAVENOUS at 21:17

## 2022-08-01 RX ADMIN — HEPARIN SODIUM 5000 UNIT(S): 5000 INJECTION INTRAVENOUS; SUBCUTANEOUS at 05:58

## 2022-08-01 RX ADMIN — Medication 3: at 08:55

## 2022-08-01 RX ADMIN — Medication 4: at 18:21

## 2022-08-01 RX ADMIN — BUDESONIDE AND FORMOTEROL FUMARATE DIHYDRATE 2 PUFF(S): 160; 4.5 AEROSOL RESPIRATORY (INHALATION) at 08:53

## 2022-08-01 RX ADMIN — Medication 1 TABLET(S): at 21:16

## 2022-08-01 RX ADMIN — BUDESONIDE AND FORMOTEROL FUMARATE DIHYDRATE 2 PUFF(S): 160; 4.5 AEROSOL RESPIRATORY (INHALATION) at 21:16

## 2022-08-02 ENCOUNTER — TRANSCRIPTION ENCOUNTER (OUTPATIENT)
Age: 82
End: 2022-08-02

## 2022-08-02 VITALS
TEMPERATURE: 97 F | SYSTOLIC BLOOD PRESSURE: 145 MMHG | DIASTOLIC BLOOD PRESSURE: 61 MMHG | RESPIRATION RATE: 16 BRPM | HEART RATE: 79 BPM | OXYGEN SATURATION: 99 %

## 2022-08-02 LAB
ALBUMIN SERPL ELPH-MCNC: 3.6 G/DL — SIGNIFICANT CHANGE UP (ref 3.3–5)
ALP SERPL-CCNC: 70 U/L — SIGNIFICANT CHANGE UP (ref 40–120)
ALT FLD-CCNC: 30 U/L — SIGNIFICANT CHANGE UP (ref 4–33)
ANION GAP SERPL CALC-SCNC: 12 MMOL/L — SIGNIFICANT CHANGE UP (ref 7–14)
AST SERPL-CCNC: 23 U/L — SIGNIFICANT CHANGE UP (ref 4–32)
BILIRUB SERPL-MCNC: <0.2 MG/DL — SIGNIFICANT CHANGE UP (ref 0.2–1.2)
BUN SERPL-MCNC: 16 MG/DL — SIGNIFICANT CHANGE UP (ref 7–23)
CALCIUM SERPL-MCNC: 9.8 MG/DL — SIGNIFICANT CHANGE UP (ref 8.4–10.5)
CHLORIDE SERPL-SCNC: 96 MMOL/L — LOW (ref 98–107)
CO2 SERPL-SCNC: 31 MMOL/L — SIGNIFICANT CHANGE UP (ref 22–31)
CREAT SERPL-MCNC: 0.98 MG/DL — SIGNIFICANT CHANGE UP (ref 0.5–1.3)
EGFR: 58 ML/MIN/1.73M2 — LOW
GLUCOSE BLDC GLUCOMTR-MCNC: 132 MG/DL — HIGH (ref 70–99)
GLUCOSE BLDC GLUCOMTR-MCNC: 238 MG/DL — HIGH (ref 70–99)
GLUCOSE SERPL-MCNC: 254 MG/DL — HIGH (ref 70–99)
HCT VFR BLD CALC: 37.1 % — SIGNIFICANT CHANGE UP (ref 34.5–45)
HGB BLD-MCNC: 11.5 G/DL — SIGNIFICANT CHANGE UP (ref 11.5–15.5)
MAGNESIUM SERPL-MCNC: 1.5 MG/DL — LOW (ref 1.6–2.6)
MCHC RBC-ENTMCNC: 28.6 PG — SIGNIFICANT CHANGE UP (ref 27–34)
MCHC RBC-ENTMCNC: 31 GM/DL — LOW (ref 32–36)
MCV RBC AUTO: 92.3 FL — SIGNIFICANT CHANGE UP (ref 80–100)
NRBC # BLD: 0 /100 WBCS — SIGNIFICANT CHANGE UP
NRBC # FLD: 0 K/UL — SIGNIFICANT CHANGE UP
PHOSPHATE SERPL-MCNC: 3.4 MG/DL — SIGNIFICANT CHANGE UP (ref 2.5–4.5)
PLATELET # BLD AUTO: 413 K/UL — HIGH (ref 150–400)
POTASSIUM SERPL-MCNC: 3.8 MMOL/L — SIGNIFICANT CHANGE UP (ref 3.5–5.3)
POTASSIUM SERPL-SCNC: 3.8 MMOL/L — SIGNIFICANT CHANGE UP (ref 3.5–5.3)
PROT SERPL-MCNC: 8.2 G/DL — SIGNIFICANT CHANGE UP (ref 6–8.3)
RBC # BLD: 4.02 M/UL — SIGNIFICANT CHANGE UP (ref 3.8–5.2)
RBC # FLD: 15.4 % — HIGH (ref 10.3–14.5)
SODIUM SERPL-SCNC: 139 MMOL/L — SIGNIFICANT CHANGE UP (ref 135–145)
WBC # BLD: 5.61 K/UL — SIGNIFICANT CHANGE UP (ref 3.8–10.5)
WBC # FLD AUTO: 5.61 K/UL — SIGNIFICANT CHANGE UP (ref 3.8–10.5)

## 2022-08-02 RX ORDER — MAGNESIUM SULFATE 500 MG/ML
2 VIAL (ML) INJECTION ONCE
Refills: 0 | Status: COMPLETED | OUTPATIENT
Start: 2022-08-02 | End: 2022-08-02

## 2022-08-02 RX ORDER — LOPERAMIDE HCL 2 MG
1 TABLET ORAL
Qty: 120 | Refills: 0
Start: 2022-08-02 | End: 2022-08-31

## 2022-08-02 RX ORDER — LACTOBACILLUS ACIDOPHILUS 100MM CELL
1 CAPSULE ORAL
Qty: 90 | Refills: 0
Start: 2022-08-02 | End: 2022-08-31

## 2022-08-02 RX ORDER — LOPERAMIDE HCL 2 MG
1 TABLET ORAL
Qty: 8 | Refills: 0
Start: 2022-08-02 | End: 2022-08-03

## 2022-08-02 RX ADMIN — Medication 50 MILLIGRAM(S): at 05:39

## 2022-08-02 RX ADMIN — Medication 2 MILLIGRAM(S): at 01:54

## 2022-08-02 RX ADMIN — Medication 25 MILLIGRAM(S): at 05:39

## 2022-08-02 RX ADMIN — HEPARIN SODIUM 5000 UNIT(S): 5000 INJECTION INTRAVENOUS; SUBCUTANEOUS at 05:37

## 2022-08-02 RX ADMIN — Medication 25 GRAM(S): at 12:48

## 2022-08-02 RX ADMIN — PANTOPRAZOLE SODIUM 40 MILLIGRAM(S): 20 TABLET, DELAYED RELEASE ORAL at 05:40

## 2022-08-02 RX ADMIN — Medication 2: at 09:19

## 2022-08-02 RX ADMIN — Medication 100 MILLIGRAM(S): at 12:55

## 2022-08-02 RX ADMIN — CHLORHEXIDINE GLUCONATE 1 APPLICATION(S): 213 SOLUTION TOPICAL at 12:57

## 2022-08-02 RX ADMIN — VALSARTAN 320 MILLIGRAM(S): 80 TABLET ORAL at 05:39

## 2022-08-02 RX ADMIN — Medication 10 MILLIGRAM(S): at 05:39

## 2022-08-02 RX ADMIN — Medication 81 MILLIGRAM(S): at 12:55

## 2022-08-02 RX ADMIN — BUDESONIDE AND FORMOTEROL FUMARATE DIHYDRATE 2 PUFF(S): 160; 4.5 AEROSOL RESPIRATORY (INHALATION) at 09:20

## 2022-08-02 RX ADMIN — Medication 1 TABLET(S): at 12:56

## 2022-08-02 RX ADMIN — TIOTROPIUM BROMIDE 1 CAPSULE(S): 18 CAPSULE ORAL; RESPIRATORY (INHALATION) at 09:21

## 2022-08-02 RX ADMIN — Medication 1 TABLET(S): at 05:40

## 2022-08-02 RX ADMIN — Medication 30 MILLIGRAM(S): at 05:38

## 2022-08-02 NOTE — DISCHARGE NOTE PROVIDER - NSDCMRMEDTOKEN_GEN_ALL_CORE_FT
allopurinol 100 mg oral tablet: orally once a day  aspirin 81 mg oral delayed release tablet: 1 tab(s) orally once a day (at bedtime)  Breztri Aerosphere inhalation aerosol: 1 puff(s) inhaled 2 times a day  ezetimibe 10 mg oral tablet: 1 tab(s) orally once a day  Farxiga 5 mg oral tablet: 1 tab(s) orally once a day  hydrALAZINE 10 mg oral tablet: 1 tab(s) orally 2 times a day  lactobacillus acidophilus oral capsule: 1 tab(s) orally 3 times a day   loperamide 2 mg oral capsule: 1 cap(s) orally every 6 hours, As Needed -loose stool - for diarrhea   Lyrica 100 mg oral capsule: 1 cap(s) orally 2 times a day  metoprolol succinate 50 mg oral tablet, extended release: 1 tab(s) orally once a day  multivitamin: 1 tab(s) orally once a day  NIFEdipine 30 mg oral tablet, extended release: 1 tab(s) orally once a day  pantoprazole 40 mg oral delayed release tablet: 1 tab(s) orally once a day  repaglinide 0.5 mg oral tablet: 1 tab(s) orally 2 times a day  Spiriva Respimat 1.25 mcg/inh inhalation aerosol: 1 puff(s) inhaled once a day  valsartan-hydrochlorothiazide 320 mg-25 mg oral tablet: 1 tab(s) orally once a day

## 2022-08-02 NOTE — DISCHARGE NOTE NURSING/CASE MANAGEMENT/SOCIAL WORK - NSDCPEFALRISK_GEN_ALL_CORE
For information on Fall & Injury Prevention, visit: [email or web address] OR  [email or web address] OR  https://www.cdc.gov/steadi/patient.html

## 2022-08-02 NOTE — PROGRESS NOTE ADULT - PROBLEM SELECTOR PROBLEM 1
Acute sepsis

## 2022-08-02 NOTE — PROGRESS NOTE ADULT - PROBLEM SELECTOR PLAN 3
hold orals  iss
hold orals
hold orals  iss

## 2022-08-02 NOTE — PROGRESS NOTE ADULT - PROBLEM SELECTOR PLAN 1
-2/2 multilobar pna  -will cover broadly, deescalate as appropriate. follow cultures  -c/w laba/ics, spiriva, prn albuterol   -reports diarrhea for last few days but reports only after po intake.  Granddaughter reports not unusually foul smelling. Denies abx use over last few months. low suspicion for cdiff
-2/2 multilobar pna  -improving clinically   diarrhea improve  GI cleared pt for dc
-2/2 multilobar pna  -will cover broadly, deescalate as appropriate. follow cultures  -c/w laba/ics, spiriva, prn albuterol   -reports diarrhea for last few days but reports only after po intake.  Granddaughter reports not unusually foul smelling. Denies abx use over last few months. low suspicion for cdiff

## 2022-08-02 NOTE — DISCHARGE NOTE PROVIDER - DETAILS OF MALNUTRITION DIAGNOSIS/DIAGNOSES
This patient has been assessed with a concern for Malnutrition and was treated during this hospitalization for the following Nutrition diagnosis/diagnoses:     -  07/26/2022: Moderate protein-calorie malnutrition

## 2022-08-02 NOTE — CHART NOTE - NSCHARTNOTEFT_GEN_A_CORE
Source: Patient A&Ox [4 ]     Per chart review, 81 year old female with a PMH of lung cancer s/p lobectomy and recent occurrence s/p radiation, DM, HTN, HLD, CAD, GERD presents with diarrhea, intermittent fever, cough, chest tightness and progressive weakness since (7/8).     Patient seen at bedside. Pt endorses appetite has improved in house, able to consume % of her meals. Patient was on clear liquids diet previously, currently on regular diet with consistent carbohydrate,  tolerating well. Denies any chewing and swallowing difficulties. Pt reports diarrhea for the last few days, ordered probiotic. Pt's most recent labs notable with low Magnesium 1.5L, ordered magnesium sulfate for repletion. Skin remains intact w/o any edema. Currently pt is not on IV hydration, but it's available for activation. Plan in placed for D/C today.          Diet, Consistent Carbohydrate w/Evening Snack (07-28-22 @ 10:54)  Diet, Consistent Carbohydrate Full Liquid (07-25-22 @ 22:17)      GI: WDL. Last BM     PO intake: % [x ]      Anthropometrics: Height (cm): 160 (07-25), 160 (06-10)  Weight (kg): 68 (07-25)  BMI (kg/m2): 26.6 (07-25)    Edema: None reported at present.   Pressure Injuries: None reported at present.     __________________ Pertinent Medications__________________   MEDICATIONS  (STANDING):  allopurinol 100 milliGRAM(s) Oral daily  aspirin  chewable 81 milliGRAM(s) Oral daily  budesonide 160 MICROgram(s)/formoterol 4.5 MICROgram(s) Inhaler 2 Puff(s) Inhalation two times a day  chlorhexidine 2% Cloths 1 Application(s) Topical daily  dextrose 5%. 1000 milliLiter(s) (100 mL/Hr) IV Continuous <Continuous>  dextrose 5%. 1000 milliLiter(s) (50 mL/Hr) IV Continuous <Continuous>  dextrose 50% Injectable 25 Gram(s) IV Push once  dextrose 50% Injectable 12.5 Gram(s) IV Push once  dextrose 50% Injectable 25 Gram(s) IV Push once  glucagon  Injectable 1 milliGRAM(s) IntraMuscular once  heparin   Injectable 5000 Unit(s) SubCutaneous every 12 hours  hydrALAZINE 10 milliGRAM(s) Oral two times a day  hydrochlorothiazide 25 milliGRAM(s) Oral daily  insulin glargine Injectable (LANTUS) 10 Unit(s) SubCutaneous at bedtime  insulin lispro (ADMELOG) corrective regimen sliding scale   SubCutaneous three times a day before meals  insulin lispro (ADMELOG) corrective regimen sliding scale   SubCutaneous at bedtime  lactobacillus acidophilus 1 Tablet(s) Oral three times a day  magnesium sulfate  IVPB 2 Gram(s) IV Intermittent once  metoprolol succinate ER 50 milliGRAM(s) Oral daily  NIFEdipine XL 30 milliGRAM(s) Oral daily  pantoprazole    Tablet 40 milliGRAM(s) Oral before breakfast  tiotropium 18 MICROgram(s) Capsule 1 Capsule(s) Inhalation daily  valsartan 320 milliGRAM(s) Oral daily    MEDICATIONS  (PRN):  ALBUTerol    90 MICROgram(s) HFA Inhaler 2 Puff(s) Inhalation every 6 hours PRN Shortness of Breath and/or Wheezing  benzonatate 100 milliGRAM(s) Oral three times a day PRN Cough  dextrose Oral Gel 15 Gram(s) Oral once PRN Blood Glucose LESS THAN 70 milliGRAM(s)/deciliter  guaiFENesin  milliGRAM(s) Oral every 12 hours PRN Cough  loperamide 2 milliGRAM(s) Oral every 6 hours PRN loose stool      __________________ Pertinent Labs__________________   08-02 Na139 mmol/L Glu 254 mg/dL<H> K+ 3.8 mmol/L Cr  0.98 mg/dL BUN 16 mg/dL 08-02 Phos 3.4 mg/dL 08-02 Alb 3.6 g/dL        POCT Blood Glucose.: 238 mg/dL (08-02-22 @ 08:46)  POCT Blood Glucose.: 222 mg/dL (08-01-22 @ 22:16)  POCT Blood Glucose.: 308 mg/dL (08-01-22 @ 17:51)  POCT Blood Glucose.: 296 mg/dL (08-01-22 @ 12:23)  POCT Blood Glucose.: 284 mg/dL (08-01-22 @ 08:31)  POCT Blood Glucose.: 172 mg/dL (07-31-22 @ 22:18)  POCT Blood Glucose.: 214 mg/dL (07-31-22 @ 17:27)  POCT Blood Glucose.: 190 mg/dL (07-31-22 @ 12:25)  POCT Blood Glucose.: 301 mg/dL (07-31-22 @ 08:26)  POCT Blood Glucose.: 183 mg/dL (07-30-22 @ 22:11)  POCT Blood Glucose.: 154 mg/dL (07-30-22 @ 17:36)          Estimated Needs: [ x] remain the same as previous   6484-4827 brittany/d @ 25-30kcal /kgbw (52.1kg)  52.1-62.5 gm pro/d @ 1.0-1.2gm pro/kgbw (52.1kg)       Previous Nutrition Diagnosis: Malnutrition     Nutrition Diagnosis is [x] Resolved    Recommendations:  1) Recommend continue with current diet, which remains appropriate at this time.   2) Recommend monitor FS and adjust insulin as needed.   3) Monitor weights, labs, and skin integrity.       Monitoring and Evaluation:      [ x] Tolerance to diet prescription [x ] weights [x ] follow up per protocol  [ ] other:

## 2022-08-02 NOTE — PROGRESS NOTE ADULT - PROBLEM SELECTOR PLAN 2
c/w broad coverage, follow cultures
improved clinically

## 2022-08-02 NOTE — PROGRESS NOTE ADULT - PROBLEM SELECTOR PLAN 4
c/w extensive regimen as tolerated

## 2022-08-02 NOTE — DISCHARGE NOTE PROVIDER - HOSPITAL COURSE
81F PMH of lung CA s/p L lobectomy and recent recurrence s/p radiation, no chemo, DM on orals, HTN, HLD, CAD, GERD, gout, p/w diarrhea, intermittent fever, tmax 100.1 orally, cough, chest tightness and progressive weakness since 7/8.    Acute sepsis  - 2/2 multilobar PNA  - BCx/UCx negative  - s/p antibiotics    Pneumonia  - s/p zosyn, vanco, azithro per ID recs  - Legionella urine negative  - CTA negative for PE  - MRSA nares negative    DM  - A1C 8.2%  - oral DM meds held during admission; continue on discharge    HTN  - c/w home regimen as tolerated    Diarrhea  - reports diarrhea for last few days, but reports only after po intake; granddaughter reports not unusually foul smelling; denies antibiotic use over last few months; low suspicion for C. diff  - C. diff/GI PCR negative  - CT abdomen: negative for intra-abdominal pathology, no colitis  - GI consulted: recommends PRN imodium    Case discussed with Dr. Betancourt on 8/2. Patient is medically stable and optimized for discharge home as per attending. All medications were reviewed and prescriptions were sent to a mutually agreed upon pharmacy. Discharge plan reviewed with patient and family.

## 2022-08-02 NOTE — PROGRESS NOTE ADULT - NUTRITIONAL ASSESSMENT
This patient has been assessed with a concern for Malnutrition and has been determined to have a diagnosis/diagnoses of Moderate protein-calorie malnutrition.    This patient is being managed with:   Diet Consistent Carbohydrate w/Evening Snack-  Entered: Jul 28 2022 10:54AM    
This patient has been assessed with a concern for Malnutrition and has been determined to have a diagnosis/diagnoses of Moderate protein-calorie malnutrition.    This patient is being managed with:   Diet Consistent Carbohydrate w/Evening Snack-  Entered: Jul 28 2022 10:54AM    
This patient has been assessed with a concern for Malnutrition and has been determined to have a diagnosis/diagnoses of Moderate protein-calorie malnutrition.    This patient is being managed with:   Diet Consistent Carbohydrate w/Evening Snack-  Entered: Jul 28 2022 10:54AM      This patient has been assessed with a concern for Malnutrition and has been determined to have a diagnosis/diagnoses of Moderate protein-calorie malnutrition.    This patient is being managed with:   Diet Consistent Carbohydrate w/Evening Snack-  Entered: Jul 28 2022 10:54AM    
This patient has been assessed with a concern for Malnutrition and has been determined to have a diagnosis/diagnoses of Moderate protein-calorie malnutrition.    This patient is being managed with:   Diet Consistent Carbohydrate w/Evening Snack-  Entered: Jul 28 2022 10:54AM    
This patient has been assessed with a concern for Malnutrition and has been determined to have a diagnosis/diagnoses of Moderate protein-calorie malnutrition.    This patient is being managed with:   Diet Consistent Carbohydrate Clear Liquid-  Entered: Jul 26 2022  3:33AM      This patient has been assessed with a concern for Malnutrition and has been determined to have a diagnosis/diagnoses of Moderate protein-calorie malnutrition.    This patient is being managed with:   Diet Consistent Carbohydrate Clear Liquid-  Entered: Jul 26 2022  3:33AM    
This patient has been assessed with a concern for Malnutrition and has been determined to have a diagnosis/diagnoses of Moderate protein-calorie malnutrition.    This patient is being managed with:   Diet Consistent Carbohydrate Clear Liquid-  Entered: Jul 26 2022  3:33AM    
This patient has been assessed with a concern for Malnutrition and has been determined to have a diagnosis/diagnoses of Moderate protein-calorie malnutrition.    This patient is being managed with:   Diet Consistent Carbohydrate w/Evening Snack-  Entered: Jul 28 2022 10:54AM    
This patient has been assessed with a concern for Malnutrition and has been determined to have a diagnosis/diagnoses of Moderate protein-calorie malnutrition.    This patient is being managed with:   Diet Consistent Carbohydrate w/Evening Snack-  Entered: Jul 28 2022 10:54AM

## 2022-08-02 NOTE — PROGRESS NOTE ADULT - SUBJECTIVE AND OBJECTIVE BOX
SUBJECTIVE / OVERNIGHT EVENTS:pt seen and examined , still with diarrhea    MEDICATIONS  (STANDING):  allopurinol 100 milliGRAM(s) Oral daily  aspirin  chewable 81 milliGRAM(s) Oral daily  budesonide 160 MICROgram(s)/formoterol 4.5 MICROgram(s) Inhaler 2 Puff(s) Inhalation two times a day  chlorhexidine 2% Cloths 1 Application(s) Topical daily  dextrose 5%. 1000 milliLiter(s) (100 mL/Hr) IV Continuous <Continuous>  dextrose 5%. 1000 milliLiter(s) (50 mL/Hr) IV Continuous <Continuous>  dextrose 50% Injectable 25 Gram(s) IV Push once  dextrose 50% Injectable 12.5 Gram(s) IV Push once  dextrose 50% Injectable 25 Gram(s) IV Push once  glucagon  Injectable 1 milliGRAM(s) IntraMuscular once  heparin   Injectable 5000 Unit(s) SubCutaneous every 12 hours  hydrALAZINE 10 milliGRAM(s) Oral two times a day  hydrochlorothiazide 25 milliGRAM(s) Oral daily  insulin glargine Injectable (LANTUS) 8 Unit(s) SubCutaneous at bedtime  insulin lispro (ADMELOG) corrective regimen sliding scale   SubCutaneous three times a day before meals  insulin lispro (ADMELOG) corrective regimen sliding scale   SubCutaneous at bedtime  lactobacillus acidophilus 1 Tablet(s) Oral three times a day  metoprolol succinate ER 50 milliGRAM(s) Oral daily  NIFEdipine XL 30 milliGRAM(s) Oral daily  pantoprazole    Tablet 40 milliGRAM(s) Oral before breakfast  piperacillin/tazobactam IVPB.. 3.375 Gram(s) IV Intermittent every 8 hours  pregabalin 100 milliGRAM(s) Oral daily  tiotropium 18 MICROgram(s) Capsule 1 Capsule(s) Inhalation daily  valsartan 320 milliGRAM(s) Oral daily    MEDICATIONS  (PRN):  ALBUTerol    90 MICROgram(s) HFA Inhaler 2 Puff(s) Inhalation every 6 hours PRN Shortness of Breath and/or Wheezing  benzonatate 100 milliGRAM(s) Oral three times a day PRN Cough  dextrose Oral Gel 15 Gram(s) Oral once PRN Blood Glucose LESS THAN 70 milliGRAM(s)/deciliter  guaiFENesin  milliGRAM(s) Oral every 12 hours PRN Cough    Vital Signs Last 24 Hrs  T(C): 36.5 (22 @ 22:22), Max: 37.2 (22 @ 13:27)  T(F): 97.7 (22 @ 22:22), Max: 98.9 (22 @ 13:27)  HR: 75 (22 @ 22:22) (75 - 88)  BP: 128/55 (22 @ 22:22) (128/55 - 133/57)  BP(mean): --  RR: 17 (22 @ 22:22) (17 - 18)  SpO2: 100% (22 @ 22:22) (97% - 100%)      Constitutional: No fever, fatigue  Skin: No rash.  Eyes: No recent vision problems or eye pain.  ENT: No congestion, ear pain, or sore throat.  Cardiovascular: No chest pain or palpation.  Respiratory: No cough, shortness of breath, congestion, or wheezing.  Gastrointestinal: No abdominal pain, nausea, vomiting, or diarrhea.  Genitourinary: No dysuria.  Musculoskeletal: No joint swelling.  Neurologic: No headache.    PHYSICAL EXAM:  GENERAL: NAD  EYES: EOMI, PERRLA  NECK: Supple, No JVD  CHEST/LUNG: dec breath sounds at bases  HEART:  S1 , S2 +  ABDOMEN: soft , bs+  EXTREMITIES:  no edema  NEUROLOGY:alert awake    LABS:      136  |  95<L>  |  11  ----------------------------<  235<H>  3.9   |  29  |  1.00    Ca    9.3      2022 05:36  Phos  2.6       Mg     1.60         TPro  7.2  /  Alb  3.1<L>  /  TBili  <0.2  /  DBili      /  AST  42<H>  /  ALT  32  /  AlkPhos  66      Creatinine Trend: 1.00 <--, 1.07 <--, 0.96 <--, 1.01 <--, 1.12 <--, 0.85 <--, 0.91 <--                        11.1   6.33  )-----------( 454      ( 2022 05:36 )             34.1     Urine Studies:  Urinalysis Basic - ( 2022 15:00 )    Color: Light Yellow / Appearance: Clear / S.011 / pH:   Gluc:  / Ketone: Trace  / Bili: Negative / Urobili: <2 mg/dL   Blood:  / Protein: 30 mg/dL / Nitrite: Negative   Leuk Esterase: Negative / RBC: 4 /HPF / WBC 2 /HPF   Sq Epi:  / Non Sq Epi: 2 /HPF / Bacteria: Negative              LIVER FUNCTIONS - ( 2022 05:36 )  Alb: 3.1 g/dL / Pro: 7.2 g/dL / ALK PHOS: 66 U/L / ALT: 32 U/L / AST: 42 U/L / GGT: x             RADIOLOGY & ADDITIONAL TESTS:    Imaging Personally Reviewed:    Consultant(s) Notes Reviewed:  yes    Care Discussed with Consultants/Other Providers:yes  
    SUBJECTIVE / OVERNIGHT EVENTS:pt seen and examined , still with diarrhea    MEDICATIONS  (STANDING):  allopurinol 100 milliGRAM(s) Oral daily  aspirin  chewable 81 milliGRAM(s) Oral daily  budesonide 160 MICROgram(s)/formoterol 4.5 MICROgram(s) Inhaler 2 Puff(s) Inhalation two times a day  chlorhexidine 2% Cloths 1 Application(s) Topical daily  dextrose 5%. 1000 milliLiter(s) (100 mL/Hr) IV Continuous <Continuous>  dextrose 5%. 1000 milliLiter(s) (50 mL/Hr) IV Continuous <Continuous>  dextrose 50% Injectable 25 Gram(s) IV Push once  dextrose 50% Injectable 12.5 Gram(s) IV Push once  dextrose 50% Injectable 25 Gram(s) IV Push once  glucagon  Injectable 1 milliGRAM(s) IntraMuscular once  heparin   Injectable 5000 Unit(s) SubCutaneous every 12 hours  hydrALAZINE 10 milliGRAM(s) Oral two times a day  hydrochlorothiazide 25 milliGRAM(s) Oral daily  insulin lispro (ADMELOG) corrective regimen sliding scale   SubCutaneous three times a day before meals  insulin lispro (ADMELOG) corrective regimen sliding scale   SubCutaneous at bedtime  metoprolol succinate ER 50 milliGRAM(s) Oral daily  NIFEdipine XL 30 milliGRAM(s) Oral daily  pantoprazole    Tablet 40 milliGRAM(s) Oral before breakfast  piperacillin/tazobactam IVPB.. 3.375 Gram(s) IV Intermittent every 8 hours  pregabalin 100 milliGRAM(s) Oral daily  tiotropium 18 MICROgram(s) Capsule 1 Capsule(s) Inhalation daily  valsartan 320 milliGRAM(s) Oral daily    MEDICATIONS  (PRN):  ALBUTerol    90 MICROgram(s) HFA Inhaler 2 Puff(s) Inhalation every 6 hours PRN Shortness of Breath and/or Wheezing  benzonatate 100 milliGRAM(s) Oral three times a day PRN Cough  dextrose Oral Gel 15 Gram(s) Oral once PRN Blood Glucose LESS THAN 70 milliGRAM(s)/deciliter  guaiFENesin  milliGRAM(s) Oral every 12 hours PRN Cough    Vital Signs Last 24 Hrs  T(C): 36.3 (22 @ 22:14), Max: 36.6 (22 @ 23:03)  T(F): 97.4 (22 @ 22:14), Max: 97.9 (22 @ 23:03)  HR: 90 (22 @ 22:14) (78 - 90)  BP: 120/53 (22 @ 22:14) (118/52 - 127/63)  BP(mean): --  RR: 17 (22 @ 22:14) (16 - 17)  SpO2: 100% (22 @ 22:14) (95% - 100%)    00%)      Constitutional: No fever, fatigue  Skin: No rash.  Eyes: No recent vision problems or eye pain.  ENT: No congestion, ear pain, or sore throat.  Cardiovascular: No chest pain or palpation.  Respiratory: No cough, shortness of breath, congestion, or wheezing.  Gastrointestinal: No abdominal pain, nausea, vomiting, or diarrhea.  Genitourinary: No dysuria.  Musculoskeletal: No joint swelling.  Neurologic: No headache.    PHYSICAL EXAM:  GENERAL: NAD  EYES: EOMI, PERRLA  NECK: Supple, No JVD  CHEST/LUNG: dec breath sounds at bases  HEART:  S1 , S2 +  ABDOMEN: soft , bs+  EXTREMITIES:  no edema  NEUROLOGY:alert awake    LABS:      133<L>  |  94<L>  |  9   ----------------------------<  265<H>  3.6   |  31  |  0.96    Ca    8.8      2022 06:00  Phos  2.5       Mg     1.40         TPro  6.8  /  Alb  2.7<L>  /  TBili  <0.2  /  DBili      /  AST  18  /  ALT  14  /  AlkPhos  66      Creatinine Trend: 0.96 <--, 1.01 <--, 1.12 <--, 0.85 <--, 0.91 <--                        10.6   5.67  )-----------( 474      ( 2022 06:00 )             34.0     Urine Studies:  Urinalysis Basic - ( 2022 15:00 )    Color: Light Yellow / Appearance: Clear / S.011 / pH:   Gluc:  / Ketone: Trace  / Bili: Negative / Urobili: <2 mg/dL   Blood:  / Protein: 30 mg/dL / Nitrite: Negative   Leuk Esterase: Negative / RBC: 4 /HPF / WBC 2 /HPF   Sq Epi:  / Non Sq Epi: 2 /HPF / Bacteria: Negative              LIVER FUNCTIONS - ( 2022 06:00 )  Alb: 2.7 g/dL / Pro: 6.8 g/dL / ALK PHOS: 66 U/L / ALT: 14 U/L / AST: 18 U/L / GGT: x                   RADIOLOGY & ADDITIONAL TESTS:    Imaging Personally Reviewed:    Consultant(s) Notes Reviewed:  yes    Care Discussed with Consultants/Other Providers:yes  
    SUBJECTIVE / OVERNIGHT EVENTS:pt seen and examined , still with diarrhea    MEDICATIONS  (STANDING):  allopurinol 100 milliGRAM(s) Oral daily  aspirin  chewable 81 milliGRAM(s) Oral daily  budesonide 160 MICROgram(s)/formoterol 4.5 MICROgram(s) Inhaler 2 Puff(s) Inhalation two times a day  chlorhexidine 2% Cloths 1 Application(s) Topical daily  dextrose 5%. 1000 milliLiter(s) (50 mL/Hr) IV Continuous <Continuous>  dextrose 5%. 1000 milliLiter(s) (100 mL/Hr) IV Continuous <Continuous>  dextrose 50% Injectable 25 Gram(s) IV Push once  dextrose 50% Injectable 12.5 Gram(s) IV Push once  dextrose 50% Injectable 25 Gram(s) IV Push once  glucagon  Injectable 1 milliGRAM(s) IntraMuscular once  heparin   Injectable 5000 Unit(s) SubCutaneous every 12 hours  hydrALAZINE 10 milliGRAM(s) Oral two times a day  hydrochlorothiazide 25 milliGRAM(s) Oral daily  insulin lispro (ADMELOG) corrective regimen sliding scale   SubCutaneous three times a day before meals  insulin lispro (ADMELOG) corrective regimen sliding scale   SubCutaneous at bedtime  metoprolol succinate ER 50 milliGRAM(s) Oral daily  NIFEdipine XL 30 milliGRAM(s) Oral daily  pantoprazole    Tablet 40 milliGRAM(s) Oral before breakfast  piperacillin/tazobactam IVPB.. 3.375 Gram(s) IV Intermittent every 8 hours  pregabalin 100 milliGRAM(s) Oral daily  tiotropium 18 MICROgram(s) Capsule 1 Capsule(s) Inhalation daily  valsartan 320 milliGRAM(s) Oral daily    MEDICATIONS  (PRN):  ALBUTerol    90 MICROgram(s) HFA Inhaler 2 Puff(s) Inhalation every 6 hours PRN Shortness of Breath and/or Wheezing  benzonatate 100 milliGRAM(s) Oral three times a day PRN Cough  dextrose Oral Gel 15 Gram(s) Oral once PRN Blood Glucose LESS THAN 70 milliGRAM(s)/deciliter  guaiFENesin  milliGRAM(s) Oral every 12 hours PRN Cough    Vital Signs Last 24 Hrs  T(C): 36.6 (22 @ 14:30), Max: 36.7 (22 @ 06:00)  T(F): 97.9 (22 @ 14:30), Max: 98 (22 @ 06:00)  HR: 79 (22 @ 14:30) (75 - 90)  BP: 132/51 (22 @ 14:30) (116/40 - 132/51)  BP(mean): --  RR: 16 (22 @ 14:30) (16 - 17)  SpO2: 99% (22 @ 14:30) (99% - 100%)      Constitutional: No fever, fatigue  Skin: No rash.  Eyes: No recent vision problems or eye pain.  ENT: No congestion, ear pain, or sore throat.  Cardiovascular: No chest pain or palpation.  Respiratory: No cough, shortness of breath, congestion, or wheezing.  Gastrointestinal: No abdominal pain, nausea, vomiting, or diarrhea.  Genitourinary: No dysuria.  Musculoskeletal: No joint swelling.  Neurologic: No headache.    PHYSICAL EXAM:  GENERAL: NAD  EYES: EOMI, PERRLA  NECK: Supple, No JVD  CHEST/LUNG: dec breath sounds at bases  HEART:  S1 , S2 +  ABDOMEN: soft , bs+  EXTREMITIES:  no edema  NEUROLOGY:alert awake    LABS:      133<L>  |  93<L>  |  12  ----------------------------<  384<H>  3.6   |  27  |  1.07    Ca    8.6      2022 06:24  Phos  2.5       Mg     1.60         TPro  6.8  /  Alb  2.7<L>  /  TBili  <0.2  /  DBili      /  AST  36<H>  /  ALT  26  /  AlkPhos  80      Creatinine Trend: 1.07 <--, 0.96 <--, 1.01 <--, 1.12 <--, 0.85 <--, 0.91 <--                        10.2   6.01  )-----------( 459      ( 2022 06:24 )             32.8     Urine Studies:  Urinalysis Basic - ( 2022 15:00 )    Color: Light Yellow / Appearance: Clear / S.011 / pH:   Gluc:  / Ketone: Trace  / Bili: Negative / Urobili: <2 mg/dL   Blood:  / Protein: 30 mg/dL / Nitrite: Negative   Leuk Esterase: Negative / RBC: 4 /HPF / WBC 2 /HPF   Sq Epi:  / Non Sq Epi: 2 /HPF / Bacteria: Negative              LIVER FUNCTIONS - ( 2022 06:24 )  Alb: 2.7 g/dL / Pro: 6.8 g/dL / ALK PHOS: 80 U/L / ALT: 26 U/L / AST: 36 U/L / GGT: x                   RADIOLOGY & ADDITIONAL TESTS:    Imaging Personally Reviewed:    Consultant(s) Notes Reviewed:  yes    Care Discussed with Consultants/Other Providers:yes  
PULMONARY PROGRESS NOTE    EVELIA SCHUSTER  MRN-[id number]    Patient is a 81y old  Female who presents with a chief complaint of weakness/pna (29 Jul 2022 12:39)      HPI:  -remains on room air  coughing  some productive sputum  -    ROS:   -    ACTIVE MEDICATION LIST:  MEDICATIONS  (STANDING):  allopurinol 100 milliGRAM(s) Oral daily  aspirin  chewable 81 milliGRAM(s) Oral daily  budesonide 160 MICROgram(s)/formoterol 4.5 MICROgram(s) Inhaler 2 Puff(s) Inhalation two times a day  chlorhexidine 2% Cloths 1 Application(s) Topical daily  dextrose 5%. 1000 milliLiter(s) (100 mL/Hr) IV Continuous <Continuous>  dextrose 5%. 1000 milliLiter(s) (50 mL/Hr) IV Continuous <Continuous>  dextrose 50% Injectable 25 Gram(s) IV Push once  dextrose 50% Injectable 12.5 Gram(s) IV Push once  dextrose 50% Injectable 25 Gram(s) IV Push once  glucagon  Injectable 1 milliGRAM(s) IntraMuscular once  heparin   Injectable 5000 Unit(s) SubCutaneous every 12 hours  hydrALAZINE 10 milliGRAM(s) Oral two times a day  hydrochlorothiazide 25 milliGRAM(s) Oral daily  insulin lispro (ADMELOG) corrective regimen sliding scale   SubCutaneous three times a day before meals  insulin lispro (ADMELOG) corrective regimen sliding scale   SubCutaneous at bedtime  metoprolol succinate ER 50 milliGRAM(s) Oral daily  NIFEdipine XL 30 milliGRAM(s) Oral daily  pantoprazole    Tablet 40 milliGRAM(s) Oral before breakfast  piperacillin/tazobactam IVPB.. 3.375 Gram(s) IV Intermittent every 8 hours  pregabalin 100 milliGRAM(s) Oral daily  tiotropium 18 MICROgram(s) Capsule 1 Capsule(s) Inhalation daily  valsartan 320 milliGRAM(s) Oral daily    MEDICATIONS  (PRN):  ALBUTerol    90 MICROgram(s) HFA Inhaler 2 Puff(s) Inhalation every 6 hours PRN Shortness of Breath and/or Wheezing  benzonatate 100 milliGRAM(s) Oral three times a day PRN Cough  dextrose Oral Gel 15 Gram(s) Oral once PRN Blood Glucose LESS THAN 70 milliGRAM(s)/deciliter  guaiFENesin  milliGRAM(s) Oral every 12 hours PRN Cough      EXAM:  Vital Signs Last 24 Hrs  T(C): 36.7 (30 Jul 2022 06:00), Max: 36.7 (30 Jul 2022 06:00)  T(F): 98 (30 Jul 2022 06:00), Max: 98 (30 Jul 2022 06:00)  HR: 75 (30 Jul 2022 06:00) (75 - 90)  BP: 116/40 (30 Jul 2022 06:00) (116/40 - 121/56)  BP(mean): --  RR: 17 (30 Jul 2022 06:00) (16 - 17)  SpO2: 100% (30 Jul 2022 06:00) (99% - 100%)    Parameters below as of 30 Jul 2022 06:00  Patient On (Oxygen Delivery Method): room air        GENERAL: The patient is awake and alert in no apparent distress.     LUNGS: Clear to auscultation without wheezing, rales or rhonchi; respirations unlabored                             10.2   6.01  )-----------( 459      ( 30 Jul 2022 06:24 )             32.8       07-30    133<L>  |  93<L>  |  12  ----------------------------<  384<H>  3.6   |  27  |  1.07    Ca    8.6      30 Jul 2022 06:24  Phos  2.5     07-30  Mg     1.60     07-30    TPro  6.8  /  Alb  2.7<L>  /  TBili  <0.2  /  DBili  x   /  AST  36<H>  /  ALT  26  /  AlkPhos  80  07-30    < from: CT Angio Chest PE Protocol w/ IV Cont (07.25.22 @ 17:35) >    ACC: [id number] EXAM:  CT ANGIO CHEST PULM ART [facility]                        ACC: [id number] EXAM:  CT ABDOMEN AND PELVIS IC                          PROCEDURE DATE:  07/25/2022          INTERPRETATION:  CLINICAL INFORMATION: Abdominal pain and diarrhea.    COMPARISON: CT abdomen and pelvis from 8/3/2020.    CONTRAST/COMPLICATIONS:  IV Contrast: Omnipaque 350  60 cc administered   0 cc discarded  Oral Contrast: NONE  Complications: None reported at time of study completion    PROCEDURE:  CT Angiography of the Chest was performed followed by portal venous phase   imaging of the Abdomen and Pelvis.  Sagittal and coronal reformats were performed as well as 3D (MIP)   reconstructions.    FINDINGS:  CHEST:  LUNGS AND LARGE AIRWAYS: Bilateral upper lobe and left lower lobe   consolidations.  PLEURA: No pleural effusion.  VESSELS: Contrast bolus is satisfactory. No main, right main, left main,   lobar or segmental pulmonary embolism. Limited evaluation of subsegmental   pulmonary arteries secondaryto motion and mixing artifact.  HEART: Heart size is normal. No pericardial effusion.  MEDIASTINUM AND YAJAIRA: No lymphadenopathy.  CHEST WALL AND LOWER NECK: Within normal limits.    ABDOMEN AND PELVIS:  LIVER: Within normal limits.  BILE DUCTS: Normal caliber.  GALLBLADDER: Within normal limits.  SPLEEN: Within normal limits.  PANCREAS: Within normal limits.  ADRENALS: Bilateral nodular adrenal glands, likely nodular hyperplasia.  KIDNEYS/URETERS: Right renal cysts. Bilateral hypodense foci too small to   characterize. No hydronephrosis.    BLADDER: Within normal limits.  REPRODUCTIVE ORGANS: Fibroid uterus.    BOWEL: No bowel obstruction. Appendix is normal. Colonic diverticulosis   without diverticulitis.  PERITONEUM: No ascites.  VESSELS: Atherosclerotic changes.  RETROPERITONEUM/LYMPH NODES: No lymphadenopathy.  ABDOMINAL WALL: Small fat-containing umbilical hernia.  BONES: Degenerative changes.    IMPRESSION:  No pulmonary embolism.  Bilateral multi lobar pneumonia.    --- End of Report ---          KAREN KAUFMAN MD; Resident Radiologist  This document has been electronically signed.  SUSHILA SANDOVAL MD; Attending Radiologist  This document has been electronically signed. Jul 25 2022  6:17PM    < end of copied text >  >>> <<<    PROBLEM LIST:  81y Female with HEALTH ISSUES - PROBLEM Dx:  Acute sepsis    Pneumonia    DM (diabetes mellitus)    HTN (hypertension)    DVT prophylaxis    Medication management              RECS:  abx per ID  Incentive spirometry  nebs prn  covid isolation precautions        Please call with any questions.    Salina Nice, DO  [facility] Pulmonary/Sleep Medicine  [phone]  
    SUBJECTIVE / OVERNIGHT EVENTS:pt seen and examined     MEDICATIONS  (STANDING):  allopurinol 100 milliGRAM(s) Oral daily  aspirin  chewable 81 milliGRAM(s) Oral daily  budesonide 160 MICROgram(s)/formoterol 4.5 MICROgram(s) Inhaler 2 Puff(s) Inhalation two times a day  chlorhexidine 2% Cloths 1 Application(s) Topical daily  dextrose 5%. 1000 milliLiter(s) (100 mL/Hr) IV Continuous <Continuous>  dextrose 5%. 1000 milliLiter(s) (50 mL/Hr) IV Continuous <Continuous>  dextrose 50% Injectable 25 Gram(s) IV Push once  dextrose 50% Injectable 12.5 Gram(s) IV Push once  dextrose 50% Injectable 25 Gram(s) IV Push once  glucagon  Injectable 1 milliGRAM(s) IntraMuscular once  heparin   Injectable 5000 Unit(s) SubCutaneous every 12 hours  hydrALAZINE 10 milliGRAM(s) Oral two times a day  hydrochlorothiazide 25 milliGRAM(s) Oral daily  insulin lispro (ADMELOG) corrective regimen sliding scale   SubCutaneous three times a day before meals  insulin lispro (ADMELOG) corrective regimen sliding scale   SubCutaneous at bedtime  metoprolol succinate ER 50 milliGRAM(s) Oral daily  NIFEdipine XL 30 milliGRAM(s) Oral daily  pantoprazole    Tablet 40 milliGRAM(s) Oral before breakfast  piperacillin/tazobactam IVPB.. 3.375 Gram(s) IV Intermittent every 8 hours  pregabalin 100 milliGRAM(s) Oral daily  tiotropium 18 MICROgram(s) Capsule 1 Capsule(s) Inhalation daily  valsartan 320 milliGRAM(s) Oral daily    MEDICATIONS  (PRN):  ALBUTerol    90 MICROgram(s) HFA Inhaler 2 Puff(s) Inhalation every 6 hours PRN Shortness of Breath and/or Wheezing  benzonatate 100 milliGRAM(s) Oral three times a day PRN Cough  dextrose Oral Gel 15 Gram(s) Oral once PRN Blood Glucose LESS THAN 70 milliGRAM(s)/deciliter    Vital Signs Last 24 Hrs  T(C): 36.9 (22 @ 13:26), Max: 36.9 (22 @ 13:26)  T(F): 98.5 (22 @ 13:26), Max: 98.5 (22 @ 13:26)  HR: 84 (22 @ 18:00) (76 - 90)  BP: 116/54 (22 @ 18:00) (116/54 - 150/65)  BP(mean): --  RR: 18 (22 @ 18:00) (18 - 18)  SpO2: 100% (22 @ 13:26) (100% - 100%)      Constitutional: No fever, fatigue  Skin: No rash.  Eyes: No recent vision problems or eye pain.  ENT: No congestion, ear pain, or sore throat.  Cardiovascular: No chest pain or palpation.  Respiratory: No cough, shortness of breath, congestion, or wheezing.  Gastrointestinal: No abdominal pain, nausea, vomiting, or diarrhea.  Genitourinary: No dysuria.  Musculoskeletal: No joint swelling.  Neurologic: No headache.    PHYSICAL EXAM:  GENERAL: NAD  EYES: EOMI, PERRLA  NECK: Supple, No JVD  CHEST/LUNG: dec breath sounds at bases  HEART:  S1 , S2 +  ABDOMEN: soft , bs+  EXTREMITIES:  no edema  NEUROLOGY:alert awake    LABS:      130<L>  |  93<L>  |  15  ----------------------------<  197<H>  3.8   |  28  |  1.12    Ca    9.1      2022 03:05  Phos  3.8       Mg     1.50         TPro  7.1  /  Alb  2.7<L>  /  TBili  0.2  /  DBili      /  AST  21  /  ALT  24  /  AlkPhos  69      Creatinine Trend: 1.12 <--, 0.85 <--, 0.91 <--                        11.0   8.51  )-----------( 488      ( 2022 03:05 )             34.2     Urine Studies:  Urinalysis Basic - ( 2022 15:00 )    Color: Light Yellow / Appearance: Clear / S.011 / pH:   Gluc:  / Ketone: Trace  / Bili: Negative / Urobili: <2 mg/dL   Blood:  / Protein: 30 mg/dL / Nitrite: Negative   Leuk Esterase: Negative / RBC: 4 /HPF / WBC 2 /HPF   Sq Epi:  / Non Sq Epi: 2 /HPF / Bacteria: Negative              LIVER FUNCTIONS - ( 2022 03:05 )  Alb: 2.7 g/dL / Pro: 7.1 g/dL / ALK PHOS: 69 U/L / ALT: 24 U/L / AST: 21 U/L / GGT: x           PT/INR - ( 2022 03:05 )   PT: 12.7 sec;   INR: 1.09 ratio         PTT - ( 2022 03:05 )  PTT:31.0 sec        RADIOLOGY & ADDITIONAL TESTS:    Imaging Personally Reviewed:    Consultant(s) Notes Reviewed:  yes    Care Discussed with Consultants/Other Providers:yes  
    SUBJECTIVE / OVERNIGHT EVENTS:pt seen and examined , diarrhea imporved    MEDICATIONS  (STANDING):  allopurinol 100 milliGRAM(s) Oral daily  aspirin  chewable 81 milliGRAM(s) Oral daily  budesonide 160 MICROgram(s)/formoterol 4.5 MICROgram(s) Inhaler 2 Puff(s) Inhalation two times a day  chlorhexidine 2% Cloths 1 Application(s) Topical daily  dextrose 5%. 1000 milliLiter(s) (100 mL/Hr) IV Continuous <Continuous>  dextrose 5%. 1000 milliLiter(s) (50 mL/Hr) IV Continuous <Continuous>  dextrose 50% Injectable 25 Gram(s) IV Push once  dextrose 50% Injectable 12.5 Gram(s) IV Push once  dextrose 50% Injectable 25 Gram(s) IV Push once  glucagon  Injectable 1 milliGRAM(s) IntraMuscular once  heparin   Injectable 5000 Unit(s) SubCutaneous every 12 hours  hydrALAZINE 10 milliGRAM(s) Oral two times a day  hydrochlorothiazide 25 milliGRAM(s) Oral daily  insulin glargine Injectable (LANTUS) 10 Unit(s) SubCutaneous at bedtime  insulin lispro (ADMELOG) corrective regimen sliding scale   SubCutaneous three times a day before meals  insulin lispro (ADMELOG) corrective regimen sliding scale   SubCutaneous at bedtime  lactobacillus acidophilus 1 Tablet(s) Oral three times a day  metoprolol succinate ER 50 milliGRAM(s) Oral daily  NIFEdipine XL 30 milliGRAM(s) Oral daily  pantoprazole    Tablet 40 milliGRAM(s) Oral before breakfast  tiotropium 18 MICROgram(s) Capsule 1 Capsule(s) Inhalation daily  valsartan 320 milliGRAM(s) Oral daily    MEDICATIONS  (PRN):  ALBUTerol    90 MICROgram(s) HFA Inhaler 2 Puff(s) Inhalation every 6 hours PRN Shortness of Breath and/or Wheezing  benzonatate 100 milliGRAM(s) Oral three times a day PRN Cough  dextrose Oral Gel 15 Gram(s) Oral once PRN Blood Glucose LESS THAN 70 milliGRAM(s)/deciliter  guaiFENesin  milliGRAM(s) Oral every 12 hours PRN Cough  loperamide 2 milliGRAM(s) Oral every 6 hours PRN loose stool    Vital Signs Last 24 Hrs  T(C): 36.3 (08-02-22 @ 14:27), Max: 36.7 (08-02-22 @ 05:38)  T(F): 97.4 (08-02-22 @ 14:27), Max: 98 (08-02-22 @ 05:38)  HR: 79 (08-02-22 @ 14:27) (79 - 84)  BP: 145/61 (08-02-22 @ 14:27) (133/61 - 145/61)  BP(mean): --  RR: 16 (08-02-22 @ 14:27) (16 - 17)  SpO2: 99% (08-02-22 @ 14:27) (94% - 99%)          Constitutional: No fever, fatigue  Skin: No rash.  Eyes: No recent vision problems or eye pain.  ENT: No congestion, ear pain, or sore throat.  Cardiovascular: No chest pain or palpation.  Respiratory: No cough, shortness of breath, congestion, or wheezing.  Gastrointestinal: No abdominal pain, nausea, vomiting, or diarrhea.  Genitourinary: No dysuria.  Musculoskeletal: No joint swelling.  Neurologic: No headache.    PHYSICAL EXAM:  GENERAL: NAD  EYES: EOMI, PERRLA  NECK: Supple, No JVD  CHEST/LUNG: dec breath sounds at bases  HEART:  S1 , S2 +  ABDOMEN: soft , bs+  EXTREMITIES:  no edema  NEUROLOGY:alert awake    LABS:  08-02    139  |  96<L>  |  16  ----------------------------<  254<H>  3.8   |  31  |  0.98    Ca    9.8      02 Aug 2022 09:07  Phos  3.4     08-02  Mg     1.50     08-02    TPro  8.2  /  Alb  3.6  /  TBili  <0.2  /  DBili      /  AST  23  /  ALT  30  /  AlkPhos  70  08-02    Creatinine Trend: 0.98 <--, 1.12 <--, 1.00 <--, 1.07 <--, 0.96 <--, 1.01 <--, 1.12 <--                        11.5   5.61  )-----------( 413      ( 02 Aug 2022 09:07 )             37.1     Urine Studies:            LIVER FUNCTIONS - ( 02 Aug 2022 09:07 )  Alb: 3.6 g/dL / Pro: 8.2 g/dL / ALK PHOS: 70 U/L / ALT: 30 U/L / AST: 23 U/L / GGT: x                 RADIOLOGY & ADDITIONAL TESTS:    Imaging Personally Reviewed:    Consultant(s) Notes Reviewed:  yes    Care Discussed with Consultants/Other Providers:yes  
Patient is a 81y Female     Patient is a 81y old  Female who presents with a chief complaint of weakness/pna (01 Aug 2022 08:06)      HPI:  80 yo f h/o reactive airway disease not on home O2, lung ca s/p lobectomy, last radiation treatment per granddaughter at bedside , DM2, HTN, glaucoma, CAD. Pt with increasing generalized weakness associated with cough that began 2 weeks ago.  Associated pleurisy. CT chest + for BL multi lobar PNA, negative for PE, but unable to fully evaluate subsegmental aa. Recently started on farxiga, UA negative for inf. PT does reports diarrhea for last few days but reports only after po intake.  Granddaughter reports not unusually foul smelling. Denies abx use over last few months  (2022 21:38)      PAST MEDICAL & SURGICAL HISTORY:  Hypertension      Diabetes      Coronary arteriosclerosis      Hyperlipidemia      Peripheral arterial disease      Lung cancer  (s/p left lobectomy)      Former smoker  Quit ~      Valvular heart disease      Hypertension      Hypercholesterolemia      CAD (coronary artery disease)  cardiac stent ; 3/18 per pt      GERD (gastroesophageal reflux disease)      Diabetes mellitus      Gout      Lung cancer  tx surgically ; pt denies chemo , denies rt      Asthma      S/P total abdominal hysterectomy      S/P coronary angiogram  no stent.  50% stenosis mid RCA [facility] otherwise no stenosis      H/O:  section      S/P lobectomy of lung      S/P thoracotomy  Left ; unsure regarding exact procedure      History of uterine prolapse  Tx surgically          MEDICATIONS  (STANDING):  allopurinol 100 milliGRAM(s) Oral daily  aspirin  chewable 81 milliGRAM(s) Oral daily  budesonide 160 MICROgram(s)/formoterol 4.5 MICROgram(s) Inhaler 2 Puff(s) Inhalation two times a day  chlorhexidine 2% Cloths 1 Application(s) Topical daily  dextrose 5%. 1000 milliLiter(s) (100 mL/Hr) IV Continuous <Continuous>  dextrose 5%. 1000 milliLiter(s) (50 mL/Hr) IV Continuous <Continuous>  dextrose 50% Injectable 25 Gram(s) IV Push once  dextrose 50% Injectable 12.5 Gram(s) IV Push once  dextrose 50% Injectable 25 Gram(s) IV Push once  glucagon  Injectable 1 milliGRAM(s) IntraMuscular once  heparin   Injectable 5000 Unit(s) SubCutaneous every 12 hours  hydrALAZINE 10 milliGRAM(s) Oral two times a day  hydrochlorothiazide 25 milliGRAM(s) Oral daily  insulin glargine Injectable (LANTUS) 10 Unit(s) SubCutaneous at bedtime  insulin lispro (ADMELOG) corrective regimen sliding scale   SubCutaneous three times a day before meals  insulin lispro (ADMELOG) corrective regimen sliding scale   SubCutaneous at bedtime  lactobacillus acidophilus 1 Tablet(s) Oral three times a day  metoprolol succinate ER 50 milliGRAM(s) Oral daily  NIFEdipine XL 30 milliGRAM(s) Oral daily  pantoprazole    Tablet 40 milliGRAM(s) Oral before breakfast  piperacillin/tazobactam IVPB.. 3.375 Gram(s) IV Intermittent every 8 hours  tiotropium 18 MICROgram(s) Capsule 1 Capsule(s) Inhalation daily  valsartan 320 milliGRAM(s) Oral daily      Allergies    No Known Drug Allergies  Nuts (Pruritus)    Intolerances        SOCIAL HISTORY:  Denies ETOh,Smoking,     FAMILY HISTORY:  Family history of heart disease    Family history of coronary artery disease (Father, Mother)        REVIEW OF SYSTEMS:    CONSTITUTIONAL: No weakness, fevers or chills  EYES/ENT: No visual changes;  No vertigo or throat pain   NECK: No pain or stiffness  RESPIRATORY: No cough, wheezing, hemoptysis; No shortness of breath  CARDIOVASCULAR: No chest pain or palpitations  GASTROINTESTINAL: No abdominal or epigastric pain. No nausea, vomiting, or hematemesis; No diarrhea or constipation. No melena or hematochezia.  GENITOURINARY: No dysuria, frequency or hematuria  NEUROLOGICAL: No numbness or weakness  SKIN: No itching, burning, rashes, or lesions   All other review of systems is negative unless indicated above.    VITAL:  T(C): , Max: 36.7 (22 @ 05:55)  T(F): , Max: 98 (22 @ 05:55)  HR: 80 (22 @ 13:33)  BP: 129/57 (22 @ 13:33)  BP(mean): --  RR: 16 (22 @ 05:55)  SpO2: 96% (22 @ 13:33)  Wt(kg): --    I and O's:        PHYSICAL EXAM:    Constitutional: NAD  HEENT: PERRLA,   Neck: No JVD  Respiratory: CTA B/L  Cardiovascular: S1 and S2  Gastrointestinal: BS+, soft, NT/ND  Extremities: No peripheral edema  Neurological: A/O x 3, no focal deficits  Psychiatric: Normal mood, normal affect  : No Cadena  Skin: No rashes  Access: Not applicable  Back: No CVA tenderness    LABS:                        11.0   5.78  )-----------( 447      ( 01 Aug 2022 07:29 )             35.2     08-    134<L>  |  94<L>  |  16  ----------------------------<  280<H>  4.3   |  27  |  1.12    Ca    9.2      01 Aug 2022 07:29  Phos  2.2     08-  Mg     1.60     08-    TPro  7.7  /  Alb  3.0<L>  /  TBili  <0.2  /  DBili  x   /  AST  37<H>  /  ALT  33  /  AlkPhos  81  08-          RADIOLOGY & ADDITIONAL STUDIES:                          
chart reviewed full consult to follow
Patient is a 81y Female     Patient is a 81y old  Female who presents with a chief complaint of weakness/pna (01 Aug 2022 18:55)      HPI:  82 yo f h/o reactive airway disease not on home O2, lung ca s/p lobectomy, last radiation treatment per granddaughter at bedside , DM2, HTN, glaucoma, CAD. Pt with increasing generalized weakness associated with cough that began 2 weeks ago.  Associated pleurisy. CT chest + for BL multi lobar PNA, negative for PE, but unable to fully evaluate subsegmental aa. Recently started on farxiga, UA negative for inf. PT does reports diarrhea for last few days but reports only after po intake.  Granddaughter reports not unusually foul smelling. Denies abx use over last few months  (2022 21:38)      PAST MEDICAL & SURGICAL HISTORY:  Hypertension      Diabetes      Coronary arteriosclerosis      Hyperlipidemia      Peripheral arterial disease      Lung cancer  (s/p left lobectomy)      Former smoker  Quit ~      Valvular heart disease      Hypertension      Hypercholesterolemia      CAD (coronary artery disease)  cardiac stent ; 3/18 per pt      GERD (gastroesophageal reflux disease)      Diabetes mellitus      Gout      Lung cancer  tx surgically ; pt denies chemo , denies rt      Asthma      S/P total abdominal hysterectomy      S/P coronary angiogram  no stent.  50% stenosis mid RCA [facility] otherwise no stenosis      H/O:  section      S/P lobectomy of lung      S/P thoracotomy  Left ; unsure regarding exact procedure      History of uterine prolapse  Tx surgically          MEDICATIONS  (STANDING):  allopurinol 100 milliGRAM(s) Oral daily  aspirin  chewable 81 milliGRAM(s) Oral daily  budesonide 160 MICROgram(s)/formoterol 4.5 MICROgram(s) Inhaler 2 Puff(s) Inhalation two times a day  chlorhexidine 2% Cloths 1 Application(s) Topical daily  dextrose 5%. 1000 milliLiter(s) (100 mL/Hr) IV Continuous <Continuous>  dextrose 5%. 1000 milliLiter(s) (50 mL/Hr) IV Continuous <Continuous>  dextrose 50% Injectable 25 Gram(s) IV Push once  dextrose 50% Injectable 12.5 Gram(s) IV Push once  dextrose 50% Injectable 25 Gram(s) IV Push once  glucagon  Injectable 1 milliGRAM(s) IntraMuscular once  heparin   Injectable 5000 Unit(s) SubCutaneous every 12 hours  hydrALAZINE 10 milliGRAM(s) Oral two times a day  hydrochlorothiazide 25 milliGRAM(s) Oral daily  insulin glargine Injectable (LANTUS) 10 Unit(s) SubCutaneous at bedtime  insulin lispro (ADMELOG) corrective regimen sliding scale   SubCutaneous three times a day before meals  insulin lispro (ADMELOG) corrective regimen sliding scale   SubCutaneous at bedtime  lactobacillus acidophilus 1 Tablet(s) Oral three times a day  metoprolol succinate ER 50 milliGRAM(s) Oral daily  NIFEdipine XL 30 milliGRAM(s) Oral daily  pantoprazole    Tablet 40 milliGRAM(s) Oral before breakfast  tiotropium 18 MICROgram(s) Capsule 1 Capsule(s) Inhalation daily  valsartan 320 milliGRAM(s) Oral daily      Allergies    No Known Drug Allergies  Nuts (Pruritus)    Intolerances        SOCIAL HISTORY:  Denies ETOh,Smoking,     FAMILY HISTORY:  Family history of heart disease    Family history of coronary artery disease (Father, Mother)        REVIEW OF SYSTEMS:    CONSTITUTIONAL: No weakness, fevers or chills  EYES/ENT: No visual changes;  No vertigo or throat pain   NECK: No pain or stiffness  RESPIRATORY: No cough, wheezing, hemoptysis; No shortness of breath  CARDIOVASCULAR: No chest pain or palpitations  GASTROINTESTINAL: No abdominal or epigastric pain. No nausea, vomiting, or hematemesis; No diarrhea or constipation. No melena or hematochezia.  GENITOURINARY: No dysuria, frequency or hematuria  NEUROLOGICAL: No numbness or weakness  SKIN: No itching, burning, rashes, or lesions   All other review of systems is negative unless indicated above.    VITAL:  T(C): , Max: 36.7 (22 @ 05:38)  T(F): , Max: 98 (22 @ 05:38)  HR: 84 (22 @ 05:38)  BP: 133/61 (22 @ 05:38)  BP(mean): --  RR: 17 (22 @ 05:38)  SpO2: 94% (22 @ 05:38)  Wt(kg): --    I and O's:        PHYSICAL EXAM:    Constitutional: NAD  HEENT: PERRLA,   Neck: No JVD  Respiratory: CTA B/L  Cardiovascular: S1 and S2  Gastrointestinal: BS+, soft, NT/ND  Extremities: No peripheral edema  Neurological: A/O x 3, no focal deficits  Psychiatric: Normal mood, normal affect  : No Cadena  Skin: No rashes  Access: Not applicable  Back: No CVA tenderness    LABS:                        11.0   5.78  )-----------( 447      ( 01 Aug 2022 07:29 )             35.2     08-    134<L>  |  94<L>  |  16  ----------------------------<  280<H>  4.3   |  27  |  1.12    Ca    9.2      01 Aug 2022 07:29  Phos  2.2     08-  Mg     1.60     08-    TPro  7.7  /  Alb  3.0<L>  /  TBili  <0.2  /  DBili  x   /  AST  37<H>  /  ALT  33  /  AlkPhos  81  -          RADIOLOGY & ADDITIONAL STUDIES:                          
    SUBJECTIVE / OVERNIGHT EVENTS:pt seen and examined     MEDICATIONS  (STANDING):  allopurinol 100 milliGRAM(s) Oral daily  aspirin  chewable 81 milliGRAM(s) Oral daily  budesonide 160 MICROgram(s)/formoterol 4.5 MICROgram(s) Inhaler 2 Puff(s) Inhalation two times a day  chlorhexidine 2% Cloths 1 Application(s) Topical daily  dextrose 5%. 1000 milliLiter(s) (100 mL/Hr) IV Continuous <Continuous>  dextrose 5%. 1000 milliLiter(s) (50 mL/Hr) IV Continuous <Continuous>  dextrose 50% Injectable 25 Gram(s) IV Push once  dextrose 50% Injectable 12.5 Gram(s) IV Push once  dextrose 50% Injectable 25 Gram(s) IV Push once  glucagon  Injectable 1 milliGRAM(s) IntraMuscular once  heparin   Injectable 5000 Unit(s) SubCutaneous every 12 hours  hydrALAZINE 10 milliGRAM(s) Oral two times a day  hydrochlorothiazide 25 milliGRAM(s) Oral daily  insulin lispro (ADMELOG) corrective regimen sliding scale   SubCutaneous three times a day before meals  insulin lispro (ADMELOG) corrective regimen sliding scale   SubCutaneous at bedtime  metoprolol succinate ER 50 milliGRAM(s) Oral daily  NIFEdipine XL 30 milliGRAM(s) Oral daily  pantoprazole    Tablet 40 milliGRAM(s) Oral before breakfast  piperacillin/tazobactam IVPB.. 3.375 Gram(s) IV Intermittent every 8 hours  pregabalin 100 milliGRAM(s) Oral daily  tiotropium 18 MICROgram(s) Capsule 1 Capsule(s) Inhalation daily  valsartan 320 milliGRAM(s) Oral daily    MEDICATIONS  (PRN):  ALBUTerol    90 MICROgram(s) HFA Inhaler 2 Puff(s) Inhalation every 6 hours PRN Shortness of Breath and/or Wheezing  benzonatate 100 milliGRAM(s) Oral three times a day PRN Cough  dextrose Oral Gel 15 Gram(s) Oral once PRN Blood Glucose LESS THAN 70 milliGRAM(s)/deciliter    Vital Signs Last 24 Hrs  T(C): 36.9 (22 @ 13:26), Max: 36.9 (22 @ 13:26)  T(F): 98.5 (22 @ 13:26), Max: 98.5 (22 @ 13:26)  HR: 84 (22 @ 18:00) (76 - 90)  BP: 116/54 (22 @ 18:00) (116/54 - 150/65)  BP(mean): --  RR: 18 (22 @ 18:00) (18 - 18)  SpO2: 100% (22 @ 13:26) (100% - 100%)      Constitutional: No fever, fatigue  Skin: No rash.  Eyes: No recent vision problems or eye pain.  ENT: No congestion, ear pain, or sore throat.  Cardiovascular: No chest pain or palpation.  Respiratory: No cough, shortness of breath, congestion, or wheezing.  Gastrointestinal: No abdominal pain, nausea, vomiting, or diarrhea.  Genitourinary: No dysuria.  Musculoskeletal: No joint swelling.  Neurologic: No headache.    PHYSICAL EXAM:  GENERAL: NAD  EYES: EOMI, PERRLA  NECK: Supple, No JVD  CHEST/LUNG: dec breath sounds at bases  HEART:  S1 , S2 +  ABDOMEN: soft , bs+  EXTREMITIES:  no edema  NEUROLOGY:alert awake    LABS:      130<L>  |  93<L>  |  15  ----------------------------<  197<H>  3.8   |  28  |  1.12    Ca    9.1      2022 03:05  Phos  3.8       Mg     1.50         TPro  7.1  /  Alb  2.7<L>  /  TBili  0.2  /  DBili      /  AST  21  /  ALT  24  /  AlkPhos  69      Creatinine Trend: 1.12 <--, 0.85 <--, 0.91 <--                        11.0   8.51  )-----------( 488      ( 2022 03:05 )             34.2     Urine Studies:  Urinalysis Basic - ( 2022 15:00 )    Color: Light Yellow / Appearance: Clear / S.011 / pH:   Gluc:  / Ketone: Trace  / Bili: Negative / Urobili: <2 mg/dL   Blood:  / Protein: 30 mg/dL / Nitrite: Negative   Leuk Esterase: Negative / RBC: 4 /HPF / WBC 2 /HPF   Sq Epi:  / Non Sq Epi: 2 /HPF / Bacteria: Negative              LIVER FUNCTIONS - ( 2022 03:05 )  Alb: 2.7 g/dL / Pro: 7.1 g/dL / ALK PHOS: 69 U/L / ALT: 24 U/L / AST: 21 U/L / GGT: x           PT/INR - ( 2022 03:05 )   PT: 12.7 sec;   INR: 1.09 ratio         PTT - ( 2022 03:05 )  PTT:31.0 sec        RADIOLOGY & ADDITIONAL TESTS:    Imaging Personally Reviewed:    Consultant(s) Notes Reviewed:  yes    Care Discussed with Consultants/Other Providers:yes  
    SUBJECTIVE / OVERNIGHT EVENTS:pt seen and examined , still with diarrhea    MEDICATIONS  (STANDING):  allopurinol 100 milliGRAM(s) Oral daily  aspirin  chewable 81 milliGRAM(s) Oral daily  budesonide 160 MICROgram(s)/formoterol 4.5 MICROgram(s) Inhaler 2 Puff(s) Inhalation two times a day  chlorhexidine 2% Cloths 1 Application(s) Topical daily  dextrose 5%. 1000 milliLiter(s) (100 mL/Hr) IV Continuous <Continuous>  dextrose 5%. 1000 milliLiter(s) (50 mL/Hr) IV Continuous <Continuous>  dextrose 50% Injectable 25 Gram(s) IV Push once  dextrose 50% Injectable 12.5 Gram(s) IV Push once  dextrose 50% Injectable 25 Gram(s) IV Push once  glucagon  Injectable 1 milliGRAM(s) IntraMuscular once  heparin   Injectable 5000 Unit(s) SubCutaneous every 12 hours  hydrALAZINE 10 milliGRAM(s) Oral two times a day  hydrochlorothiazide 25 milliGRAM(s) Oral daily  insulin glargine Injectable (LANTUS) 10 Unit(s) SubCutaneous at bedtime  insulin lispro (ADMELOG) corrective regimen sliding scale   SubCutaneous three times a day before meals  insulin lispro (ADMELOG) corrective regimen sliding scale   SubCutaneous at bedtime  lactobacillus acidophilus 1 Tablet(s) Oral three times a day  metoprolol succinate ER 50 milliGRAM(s) Oral daily  NIFEdipine XL 30 milliGRAM(s) Oral daily  pantoprazole    Tablet 40 milliGRAM(s) Oral before breakfast  tiotropium 18 MICROgram(s) Capsule 1 Capsule(s) Inhalation daily  valsartan 320 milliGRAM(s) Oral daily    MEDICATIONS  (PRN):  ALBUTerol    90 MICROgram(s) HFA Inhaler 2 Puff(s) Inhalation every 6 hours PRN Shortness of Breath and/or Wheezing  benzonatate 100 milliGRAM(s) Oral three times a day PRN Cough  dextrose Oral Gel 15 Gram(s) Oral once PRN Blood Glucose LESS THAN 70 milliGRAM(s)/deciliter  guaiFENesin  milliGRAM(s) Oral every 12 hours PRN Cough  loperamide 2 milliGRAM(s) Oral every 6 hours PRN loose stool    Vital Signs Last 24 Hrs  T(C): 36.6 (08-01-22 @ 21:16), Max: 36.7 (08-01-22 @ 05:55)  T(F): 97.9 (08-01-22 @ 21:16), Max: 98 (08-01-22 @ 05:55)  HR: 83 (08-01-22 @ 21:16) (80 - 86)  BP: 134/57 (08-01-22 @ 21:16) (129/57 - 158/70)  BP(mean): --  RR: 16 (08-01-22 @ 21:16) (16 - 16)  SpO2: 99% (08-01-22 @ 21:16) (96% - 100%)        Constitutional: No fever, fatigue  Skin: No rash.  Eyes: No recent vision problems or eye pain.  ENT: No congestion, ear pain, or sore throat.  Cardiovascular: No chest pain or palpation.  Respiratory: No cough, shortness of breath, congestion, or wheezing.  Gastrointestinal: No abdominal pain, nausea, vomiting, or diarrhea.  Genitourinary: No dysuria.  Musculoskeletal: No joint swelling.  Neurologic: No headache.    PHYSICAL EXAM:  GENERAL: NAD  EYES: EOMI, PERRLA  NECK: Supple, No JVD  CHEST/LUNG: dec breath sounds at bases  HEART:  S1 , S2 +  ABDOMEN: soft , bs+  EXTREMITIES:  no edema  NEUROLOGY:alert awake    LABS:  08-01    134<L>  |  94<L>  |  16  ----------------------------<  280<H>  4.3   |  27  |  1.12    Ca    9.2      01 Aug 2022 07:29  Phos  2.2     08-01  Mg     1.60     08-01    TPro  7.7  /  Alb  3.0<L>  /  TBili  <0.2  /  DBili      /  AST  37<H>  /  ALT  33  /  AlkPhos  81  08-01    Creatinine Trend: 1.12 <--, 1.00 <--, 1.07 <--, 0.96 <--, 1.01 <--, 1.12 <--, 0.85 <--                        11.0   5.78  )-----------( 447      ( 01 Aug 2022 07:29 )             35.2     Urine Studies:            LIVER FUNCTIONS - ( 01 Aug 2022 07:29 )  Alb: 3.0 g/dL / Pro: 7.7 g/dL / ALK PHOS: 81 U/L / ALT: 33 U/L / AST: 37 U/L / GGT: x                   RADIOLOGY & ADDITIONAL TESTS:    Imaging Personally Reviewed:    Consultant(s) Notes Reviewed:  yes    Care Discussed with Consultants/Other Providers:yes  
    SUBJECTIVE / OVERNIGHT EVENTS:pt seen and examined     MEDICATIONS  (STANDING):  allopurinol 100 milliGRAM(s) Oral daily  aspirin  chewable 81 milliGRAM(s) Oral daily  azithromycin  IVPB      azithromycin  IVPB 500 milliGRAM(s) IV Intermittent every 24 hours  budesonide 160 MICROgram(s)/formoterol 4.5 MICROgram(s) Inhaler 2 Puff(s) Inhalation two times a day  chlorhexidine 2% Cloths 1 Application(s) Topical daily  dextrose 5%. 1000 milliLiter(s) (100 mL/Hr) IV Continuous <Continuous>  dextrose 5%. 1000 milliLiter(s) (50 mL/Hr) IV Continuous <Continuous>  dextrose 50% Injectable 25 Gram(s) IV Push once  dextrose 50% Injectable 12.5 Gram(s) IV Push once  dextrose 50% Injectable 25 Gram(s) IV Push once  glucagon  Injectable 1 milliGRAM(s) IntraMuscular once  heparin   Injectable 5000 Unit(s) SubCutaneous every 12 hours  hydrALAZINE 10 milliGRAM(s) Oral two times a day  hydrochlorothiazide 25 milliGRAM(s) Oral daily  insulin lispro (ADMELOG) corrective regimen sliding scale   SubCutaneous three times a day before meals  insulin lispro (ADMELOG) corrective regimen sliding scale   SubCutaneous at bedtime  metoprolol succinate ER 50 milliGRAM(s) Oral daily  NIFEdipine XL 30 milliGRAM(s) Oral daily  pantoprazole    Tablet 40 milliGRAM(s) Oral before breakfast  piperacillin/tazobactam IVPB.. 3.375 Gram(s) IV Intermittent every 8 hours  pregabalin 100 milliGRAM(s) Oral daily  tiotropium 18 MICROgram(s) Capsule 1 Capsule(s) Inhalation daily  valsartan 320 milliGRAM(s) Oral daily    MEDICATIONS  (PRN):  ALBUTerol    90 MICROgram(s) HFA Inhaler 2 Puff(s) Inhalation every 6 hours PRN Shortness of Breath and/or Wheezing  benzonatate 100 milliGRAM(s) Oral three times a day PRN Cough  dextrose Oral Gel 15 Gram(s) Oral once PRN Blood Glucose LESS THAN 70 milliGRAM(s)/deciliter    T(C): 36.6 (22 @ 22:22), Max: 36.9 (22 @ 13:31)  HR: 80 (22 @ 22:22) (80 - 96)  BP: 142/59 (22 @ 22:22) (135/50 - 155/68)  RR: 18 (22 @ 22:22) (16 - 18)  SpO2: 100% (22 @ 22:22) (94% - 100%)    CAPILLARY BLOOD GLUCOSE      POCT Blood Glucose.: 380 mg/dL (2022 21:48)  POCT Blood Glucose.: 225 mg/dL (2022 17:52)  POCT Blood Glucose.: 95 mg/dL (2022 12:26)  POCT Blood Glucose.: 334 mg/dL (2022 08:22)    I&O's Summary      Constitutional: No fever, fatigue  Skin: No rash.  Eyes: No recent vision problems or eye pain.  ENT: No congestion, ear pain, or sore throat.  Cardiovascular: No chest pain or palpation.  Respiratory: No cough, shortness of breath, congestion, or wheezing.  Gastrointestinal: No abdominal pain, nausea, vomiting, or diarrhea.  Genitourinary: No dysuria.  Musculoskeletal: No joint swelling.  Neurologic: No headache.    PHYSICAL EXAM:  GENERAL: NAD  EYES: EOMI, PERRLA  NECK: Supple, No JVD  CHEST/LUNG: dec breath sounds at bases  HEART:  S1 , S2 +  ABDOMEN: soft , bs+  EXTREMITIES:  no edema  NEUROLOGY:alert awake      LABS:                        10.6   11.94 )-----------( 471      ( 2022 05:43 )             33.0         130<L>  |  92<L>  |  14  ----------------------------<  338<H>  3.0<L>   |  28  |  0.85    Ca    8.7      2022 05:43    TPro  6.8  /  Alb  2.6<L>  /  TBili  0.3  /  DBili  x   /  AST  17  /  ALT  25  /  AlkPhos  75  07-26    PT/INR - ( 2022 05:43 )   PT: 13.5 sec;   INR: 1.16 ratio         PTT - ( 2022 05:43 )  PTT:25.3 sec      Urinalysis Basic - ( 2022 15:00 )    Color: Light Yellow / Appearance: Clear / S.011 / pH: x  Gluc: x / Ketone: Trace  / Bili: Negative / Urobili: <2 mg/dL   Blood: x / Protein: 30 mg/dL / Nitrite: Negative   Leuk Esterase: Negative / RBC: 4 /HPF / WBC 2 /HPF   Sq Epi: x / Non Sq Epi: 2 /HPF / Bacteria: Negative        RADIOLOGY & ADDITIONAL TESTS:    Imaging Personally Reviewed:    Consultant(s) Notes Reviewed:      Care Discussed with Consultants/Other Providers:

## 2022-08-02 NOTE — DISCHARGE NOTE NURSING/CASE MANAGEMENT/SOCIAL WORK - PATIENT PORTAL LINK FT
You can access the FollowMyHealth Patient Portal offered by [facility] by registering at the following website: [email or web address]. By joining [facility]’s FollowMyHealth portal, you will also be able to view your health information using other applications (apps) compatible with our system.

## 2022-08-02 NOTE — DISCHARGE NOTE PROVIDER - NSDCCPCAREPLAN_GEN_ALL_CORE_FT
PRINCIPAL DISCHARGE DIAGNOSIS  Diagnosis: Pneumonia  Assessment and Plan of Treatment: You received antibiotics for pneumonia. Follow up with your primary care provider in 1 week of discharge.      SECONDARY DISCHARGE DIAGNOSES  Diagnosis: DM (diabetes mellitus)  Assessment and Plan of Treatment: Your hemoglobin A1C is 8.2%. Target goal for hemoglobin A1C is <7%. Monitor blood glucose levels throughout the day, before meals and at bedtime. Record blood sugars and bring to outpatient provider appointments in order to be reviewed by your doctor for management modifications. If your sugars are more than 400 or less than 70 you should contact your primary care provider immediately. Monitor for signs/symptoms of low blood glucose, such as dizziness, altered mental status, or cool/clammy skin. In addition, monitor for signs/symptoms of high blood glucose, such as feeling hot, dry, fatigued, or with increased thirst/urination. Make regular podiatry appointments in order to have feet checked for wounds and uncontrolled toe nail growth to prevent infections. Make regular ophthalmology appointments to monitor your vision.    Diagnosis: HTN (hypertension)  Assessment and Plan of Treatment: Follow a low sodium/low fat diet, continue your anti-hypertensive medications as prescribed, and follow up with your primary care provider.    Diagnosis: Diarrhea  Assessment and Plan of Treatment: Continue with imodium and probiotic as needed for loose stools.  If you become constipated, you may take senna or miralax as needed.  Follow up with your primary care provider on discharge.

## 2022-08-02 NOTE — DISCHARGE NOTE PROVIDER - NSDCFUSCHEDAPPT_GEN_ALL_CORE_FT
Preet Esparza  [facility]  NEPHRO OP [id number] [address]  Scheduled Appointment: 08/10/2022    [facility]  NEUROLOGY  Comm D  Scheduled Appointment: 10/27/2022

## 2022-08-02 NOTE — PROGRESS NOTE ADULT - REASON FOR ADMISSION
weakness/pna

## 2022-08-02 NOTE — PROGRESS NOTE ADULT - ASSESSMENT
82 yo f sepsis 2/2 multi-lobar pna
await stool studies  ? antibiotic associated diarrhea  recommend imodium  will increase to lomoitl if no imrpvoment
80 yo f sepsis 2/2 multi-lobar pna
82 yo f sepsis 2/2 multi-lobar pna
case d/w np  ct negative  ? antibiotic associated diarrhea  kareem stool tests. imodium for now  can change to lomotil if no improvment
80 yo f sepsis 2/2 multi-lobar pna
82 yo f sepsis 2/2 multi-lobar pna
80 yo f sepsis 2/2 multi-lobar pna

## 2022-08-03 LAB — HAEM INFLU B AB SER-MCNC: 0.17 UG/ML — SIGNIFICANT CHANGE UP

## 2022-08-10 ENCOUNTER — APPOINTMENT (OUTPATIENT)
Dept: NEPHROLOGY | Facility: CLINIC | Age: 82
End: 2022-08-10

## 2022-10-27 ENCOUNTER — APPOINTMENT (OUTPATIENT)
Dept: NEUROLOGY | Facility: HOSPITAL | Age: 82
End: 2022-10-27

## 2023-08-16 NOTE — DIETITIAN INITIAL EVALUATION ADULT - CALCULATED FROM (CAL/KG)
Nothing to worry about it, iron deficiency with a low white cell count as long as she is going to the hematology.   I will follow-up after the hematology consultation she should restart taking iron tablets over-the-counter every other day it called ferrous sulfate 325 mg [id number]

## 2024-01-25 ENCOUNTER — EMERGENCY (EMERGENCY)
Facility: HOSPITAL | Age: 84
LOS: 1 days | Discharge: HOME | End: 2024-01-25
Attending: STUDENT IN AN ORGANIZED HEALTH CARE EDUCATION/TRAINING PROGRAM | Admitting: STUDENT IN AN ORGANIZED HEALTH CARE EDUCATION/TRAINING PROGRAM
Payer: MEDICARE

## 2024-01-25 VITALS
RESPIRATION RATE: 16 BRPM | SYSTOLIC BLOOD PRESSURE: 135 MMHG | TEMPERATURE: 99 F | DIASTOLIC BLOOD PRESSURE: 71 MMHG | HEART RATE: 78 BPM | OXYGEN SATURATION: 98 %

## 2024-01-25 DIAGNOSIS — Z87.42 PERSONAL HISTORY OF OTHER DISEASES OF THE FEMALE GENITAL TRACT: Chronic | ICD-10-CM

## 2024-01-25 DIAGNOSIS — Z98.89 OTHER SPECIFIED POSTPROCEDURAL STATES: Chronic | ICD-10-CM

## 2024-01-25 DIAGNOSIS — Z90.710 ACQUIRED ABSENCE OF BOTH CERVIX AND UTERUS: Chronic | ICD-10-CM

## 2024-01-25 DIAGNOSIS — Z98.890 OTHER SPECIFIED POSTPROCEDURAL STATES: Chronic | ICD-10-CM

## 2024-01-25 DIAGNOSIS — Z90.2 ACQUIRED ABSENCE OF LUNG [PART OF]: Chronic | ICD-10-CM

## 2024-01-25 LAB
ALBUMIN SERPL ELPH-MCNC: 3.5 G/DL — SIGNIFICANT CHANGE UP (ref 3.3–5)
ALP SERPL-CCNC: 58 U/L — SIGNIFICANT CHANGE UP (ref 40–120)
ALT FLD-CCNC: 12 U/L — SIGNIFICANT CHANGE UP (ref 4–33)
ANION GAP SERPL CALC-SCNC: 14 MMOL/L — SIGNIFICANT CHANGE UP (ref 7–14)
APPEARANCE UR: CLEAR — SIGNIFICANT CHANGE UP
AST SERPL-CCNC: 18 U/L — SIGNIFICANT CHANGE UP (ref 4–32)
BILIRUB SERPL-MCNC: 0.5 MG/DL — SIGNIFICANT CHANGE UP (ref 0.2–1.2)
BILIRUB UR-MCNC: NEGATIVE — SIGNIFICANT CHANGE UP
BUN SERPL-MCNC: 22 MG/DL — SIGNIFICANT CHANGE UP (ref 7–23)
CALCIUM SERPL-MCNC: 9.5 MG/DL — SIGNIFICANT CHANGE UP (ref 8.4–10.5)
CHLORIDE SERPL-SCNC: 96 MMOL/L — LOW (ref 98–107)
CO2 SERPL-SCNC: 25 MMOL/L — SIGNIFICANT CHANGE UP (ref 22–31)
COLOR SPEC: YELLOW — SIGNIFICANT CHANGE UP
CREAT SERPL-MCNC: 1.15 MG/DL — SIGNIFICANT CHANGE UP (ref 0.5–1.3)
DIFF PNL FLD: NEGATIVE — SIGNIFICANT CHANGE UP
EGFR: 47 ML/MIN/1.73M2 — LOW
GLUCOSE SERPL-MCNC: 203 MG/DL — HIGH (ref 70–99)
GLUCOSE UR QL: >=1000 MG/DL
KETONES UR-MCNC: NEGATIVE MG/DL — SIGNIFICANT CHANGE UP
LEUKOCYTE ESTERASE UR-ACNC: NEGATIVE — SIGNIFICANT CHANGE UP
LIDOCAIN IGE QN: 68 U/L — HIGH (ref 7–60)
NITRITE UR-MCNC: NEGATIVE — SIGNIFICANT CHANGE UP
PH UR: 6.5 — SIGNIFICANT CHANGE UP (ref 5–8)
POTASSIUM SERPL-MCNC: 4.5 MMOL/L — SIGNIFICANT CHANGE UP (ref 3.5–5.3)
POTASSIUM SERPL-SCNC: 4.5 MMOL/L — SIGNIFICANT CHANGE UP (ref 3.5–5.3)
PROT SERPL-MCNC: 7.7 G/DL — SIGNIFICANT CHANGE UP (ref 6–8.3)
PROT UR-MCNC: SIGNIFICANT CHANGE UP MG/DL
SODIUM SERPL-SCNC: 135 MMOL/L — SIGNIFICANT CHANGE UP (ref 135–145)
SP GR SPEC: 1.02 — SIGNIFICANT CHANGE UP (ref 1–1.03)
TROPONIN T, HIGH SENSITIVITY RESULT: 22 NG/L — SIGNIFICANT CHANGE UP
UROBILINOGEN FLD QL: 0.2 MG/DL — SIGNIFICANT CHANGE UP (ref 0.2–1)

## 2024-01-25 PROCEDURE — 99285 EMERGENCY DEPT VISIT HI MDM: CPT

## 2024-01-25 PROCEDURE — 74177 CT ABD & PELVIS W/CONTRAST: CPT | Mod: 26,MA

## 2024-01-25 PROCEDURE — 93010 ELECTROCARDIOGRAM REPORT: CPT

## 2024-01-25 RX ORDER — FAMOTIDINE 10 MG/ML
20 INJECTION INTRAVENOUS ONCE
Refills: 0 | Status: DISCONTINUED | OUTPATIENT
Start: 2024-01-25 | End: 2024-01-25

## 2024-01-25 RX ORDER — FAMOTIDINE 10 MG/ML
20 INJECTION INTRAVENOUS ONCE
Refills: 0 | Status: COMPLETED | OUTPATIENT
Start: 2024-01-25 | End: 2024-01-25

## 2024-01-25 RX ORDER — ACETAMINOPHEN 500 MG
975 TABLET ORAL ONCE
Refills: 0 | Status: COMPLETED | OUTPATIENT
Start: 2024-01-25 | End: 2024-01-25

## 2024-01-25 RX ORDER — SODIUM CHLORIDE 9 MG/ML
1000 INJECTION INTRAMUSCULAR; INTRAVENOUS; SUBCUTANEOUS ONCE
Refills: 0 | Status: COMPLETED | OUTPATIENT
Start: 2024-01-25 | End: 2024-01-25

## 2024-01-25 RX ADMIN — Medication 975 MILLIGRAM(S): at 22:42

## 2024-01-25 RX ADMIN — SODIUM CHLORIDE 1000 MILLILITER(S): 9 INJECTION INTRAMUSCULAR; INTRAVENOUS; SUBCUTANEOUS at 22:43

## 2024-01-25 RX ADMIN — Medication 30 MILLILITER(S): at 22:42

## 2024-01-25 RX ADMIN — FAMOTIDINE 20 MILLIGRAM(S): 10 INJECTION INTRAVENOUS at 22:43

## 2024-01-25 NOTE — ED PROVIDER NOTE - ATTENDING CONTRIBUTION TO CARE
83-year-old female, history of CAD, diabetes, GERD, lung CA in remission, presenting with 2 days onset of supraumbilical/epigastric abdominal pain PE: Well appearing, RRR, CTA BL lungs, abd soft tender to palpation right lower quadrant and left sided abd ND. A/P Labs, imaging, medicate, reassess

## 2024-01-25 NOTE — ED PROVIDER NOTE - CLINICAL SUMMARY MEDICAL DECISION MAKING FREE TEXT BOX
83-year-old female, history of CAD, diabetes, GERD, lung CA in remission, presenting with 2 days onset of supraumbilical/epigastric abdominal pain.  Reports pain is 8 out of 10, worsening with movement.  Nonradiating.  No associated symptoms of nausea, vomiting, diarrhea, fever, urinary symptoms, chest pain, shortness of breath.  No similar experience in the past.  Patient has unremarkable cardiac workup done 3 months ago. Vital signs within normal limits.  Physical exam as noted above.  Patient is well-appearing, not in acute distress, normal respiratory effort, clear lung exam bilaterally, abdomen soft, left upper quadrant and right lower quadrant abdominal tenderness on palpation noted, no leg swelling noted.  Differentials include but not limited to diverticulitis versus gastritis versus appendicitis versus UTI.  Will get labs, CT abdominal pelvis, UA, pain control, reassess. 83-year-old female, history of CAD, diabetes, GERD, lung CA in remission, presenting with 2 days onset of supraumbilical/epigastric abdominal pain.  Reports pain is 8 out of 10, worsening with movement.  Nonradiating.  No associated symptoms of nausea, vomiting, diarrhea, fever, urinary symptoms, chest pain, shortness of breath.  No similar experience in the past.  Patient has unremarkable cardiac workup done 3 months ago. Vital signs within normal limits. EKG is unchanged from prior EKG in 2022. Physical exam as noted above.  Patient is well-appearing, not in acute distress, normal respiratory effort, clear lung exam bilaterally, abdomen soft, left upper quadrant and right lower quadrant abdominal tenderness on palpation noted, no leg swelling noted.  Differentials include but not limited to diverticulitis versus gastritis versus appendicitis versus UTI.  Will get labs, CT abdominal pelvis, UA, pain control, reassess.

## 2024-01-25 NOTE — ED PROVIDER NOTE - PATIENT PORTAL LINK FT
You can access the FollowMyHealth Patient Portal offered by [facility] by registering at the following website: [email or web address]. By joining [facility]’s FollowMyHealth portal, you will also be able to view your health information using other applications (apps) compatible with our system.

## 2024-01-25 NOTE — ED PROVIDER NOTE - PROGRESS NOTE DETAILS
Sergio Blandon MD (PGY3): Labs non-actionable. ct w/ diverticultis. given unasyn in ed. rx fo Augmentin sent. will provide gi follow-up. strict return precautions discussed.

## 2024-01-25 NOTE — ED ADULT TRIAGE NOTE - CHIEF COMPLAINT QUOTE
Pt c/o epigastric pain beginning this morning, described as sharp. Denies fever, chills, SOB, n/v. phx: DM2, CAD, GERD

## 2024-01-25 NOTE — ED PROVIDER NOTE - OBJECTIVE STATEMENT
Chief complaint:   No chief complaint on file.      History of left L5-S1 microdiscectomy by Huseyin Jack in 2003  History of L4 and L5 decompressive laminectomies with Dr. Yadav in 2011  Chronic axial low back pain  Development of severe left greater than right buttock, posterior thigh, and calf pain in December 2018  Bilateral anterolateral thigh and leg pain    Vitals:  There were no vitals taken for this visit.    HISTORY OF PRESENT ILLNESS     HPI  Michele returns for re-evaluation.  He has developed bilateral anterior and anterolateral thigh pain with radiation into the legs.  These symptoms are relatively constant.  They interfere with most daily activities.  He develops tingling and heaviness with prolonged standing and walking.    In 2003 Michele required and L5-S1 decompression surgery. He was told that during the surgery his left S1 nerve root was injured. He has had chronic left lower extremity weakness since that nerve root injury.       Treatment:  Current medical management: None.  Past medical management: N/A  Physical therapy: In the past.  Chiropractic care: None.  Spinal injections: with Dr. Alcocer.  Other: None.       Other significant problems:  Patient Active Problem List    Diagnosis Date Noted   • Pain- Interventional Pain Management 08/12/2018     Priority: Medium     Procedure Relief Sheet: Michele Godoy    Ref: Dr. Almanza     Date  Procedure/Office visit % Relief Amt  Steroid NOTES Provider/  Initial   08/08/18 NPE       9/4/2018 Right Piriformis injection (60-70% pain relief)  100% K40   sa/dg    11/07/18 FJNB (B) L3-4 L4-5 L5S1 100%   sa/dg   12/13/18 FJNB (R) L3-4 L4-5 L5S1 100%   sa/dg           01/02/19 RFA (B) L3-4 L4-5 L5S1 100% X 8 MONTHS K40  sa/dg   09/24/19 RFA (B) L3-4 L4-5 L5S1 50% K40  sa/dg   12/03/19 SI joint (right) 100% K40  sa/dg                  • Arachnoiditis 02/13/2020     Priority: Low   • Postlaminectomy syndrome, lumbar region 02/13/2020     Priority: Low   •  Long term (current) use of anticoagulants 07/29/2019     Priority: Low   • Paroxysmal atrial fibrillation (CMS/HCC) 07/11/2019     Priority: Low   • Coronary artery disease involving coronary bypass graft of native heart with angina pectoris (CMS/HCC) 02/19/2019     Priority: Low   • Hypertensive heart disease with heart failure (CMS/HCC) 09/26/2018     Priority: Low   • Personal history of basal cell carcinoma on the right tip of the nose treated with MOHS 04/2018 06/14/2018     Priority: Low   • Ischemic heart disease due to coronary artery obstruction (CMS/HCC) 05/25/2018     Priority: Low   • Diabetic nephropathy associated with type 2 diabetes mellitus (CMS/HCC) 05/25/2018     Priority: Low   • DJD L glenohumeral joint 12/19/2017     Priority: Low   • Subacute cutaneous lupus erythematosus 11/02/2016     Priority: Low   • Type 2 diabetes mellitus with diabetic peripheral angiopathy without gangrene, without long-term current use of insulin (CMS/HCC) 10/12/2015     Priority: Low   • Mitral valve regurgitation 07/16/2015     Priority: Low     Moderate per ECHO 7/13/2015     • Gouty arthritis 06/11/2015     Priority: Low   • Osteopenia 02/26/2015     Priority: Low   • Benign prostatic hyperplasia 02/26/2015     Priority: Low   • Erectile dysfunction 02/26/2015     Priority: Low   • CKD (chronic kidney disease) stage 2, GFR 60-89 ml/min 11/18/2014     Priority: Low   • History of cardiac catheterization 06/06/2014     Priority: Low     4/18/2012  SUMMARY:   1. Severe 3-vessel coronary artery disease.   2. Patent bypass grafts.   3. Severe disease and bypass graft, aorta to posterior descending artery after anastomosis distally.       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     Priority: Low   • Low back pain 08/02/2013     Priority: Low   • Obesity, unspecified 06/10/2013     Priority: Low   • Axillary fullness 03/20/2013     Priority: Low   • Dyslipidemia 05/07/2012     Priority: Low   • Hematoma complicating a procedure 05/03/2012     Priority: Low     Right groin, post cath of 4/18/2012     • Preoperative examination, unspecified 04/10/2012     Priority: Low   • Postsurgical aortocoronary bypass status 04/10/2012     Priority: Low   • CAD (coronary artery disease) 01/16/2012     Priority: Low   • Hypertension complicating diabetes (CMS/HCC) 12/06/2011     Priority: Low   • History of SCC (squamous cell carcinoma) on his left forehead in 2004 and basal cell carcinoma on the left forehead in 2008 10/25/2011     Priority: Low   • Hooks's esophagus 08/24/2009     Priority: Low   • Esophageal reflux 08/24/2009     Priority: Low   • Other specified gastritis without mention of hemorrhage 08/24/2009     Priority: Low   • Trigger Point - Right Rhomboid 08/17/2009     Priority: Low       PAST MEDICAL, FAMILY AND SOCIAL HISTORY     Medications:  Current Outpatient Medications   Medication   • labetalol (NORMODYNE) 200 MG tablet   • predniSONE (DELTASONE) 10 MG tablet   • fluticasone (FLONASE) 50 MCG/ACT nasal spray   • pantoprazole (PROTONIX) 40 MG tablet   • pravastatin (PRAVACHOL) 40 MG tablet   • amLODIPine (NORVASC) 5 MG tablet   • finasteride (PROSCAR) 5 MG tablet   • tamsulosin (FLOMAX) 0.4 MG Cap   • metFORMIN (GLUCOPHAGE) 500 MG tablet   • hydroxychloroquine (PLAQUENIL) 200 MG tablet   • lisinopril (ZESTRIL) 20 MG tablet   • furosemide (LASIX) 20 MG tablet   • spironolactone (ALDACTONE) 25 MG tablet   • cetirizine (ZYRTEC ALLERGY) 10 MG tablet   • ketoconazole  (NIZORAL) 2 % shampoo   • doxycycline monohydrate (MONODOX) 50 MG capsule   • mometasone (ELOCON) 0.1 % lotion   • acetaminophen (TYLENOL) 650 MG CR tablet   • triamcinolone (ARISTOCORT) 0.1 % cream   • gabapentin (NEURONTIN) 300 MG capsule   • vitamin B-12 (CYANOCOBALAMIN) 1000 MCG tablet   • Ferrous Gluconate (IRON 27 PO)   • sildenafil (VIAGRA) 100 MG tablet   • ALPRAZolam (XANAX) 0.5 MG tablet   • aspirin 81 MG tablet   • cholecalciferol (VITAMIN D3) 1000 UNITS tablet   • latanoprost (XALATAN) 0.005 % ophthalmic solution     No current facility-administered medications for this visit.        Allergies:  ALLERGIES:   Allergen Reactions   • Tetanus Toxoid SHORTNESS OF BREATH and Other (See Comments)     tachycardia   • Chlorhexidine RASH       Past Medical  History/Surgeries:  Past Medical History:   Diagnosis Date   • Abnormal echocardiogram 6/6/2014 1/31/2014 Echocardiogram  Indication: pre-operative evaluation for abdominal surgery History of CAD; S/P CABG TDS: contrast used - post op paradoxical septal motion Normal LV size and systolic function LVEF = 60% Grade 2/4 diastolic dysfunction Grossly valve function os OK No pericardial effusion Unable to estimate PASP    • Anxiety    • Anxiety disorder    • Hooks's esophagus 8/24/2009   • Basal cell carcinoma 04/03/2018    nose   • Blood transfusion 2000   • CAD (coronary artery disease) 1/16/2012   • Chronic kidney disease 08/12/2014    stage 3   • Chronic pain 08/02/2013    back   • Claustrophobia    • Coronary Artery Disease    • Degenerative joint disease     gouty   • Diabetes    • Diabetes mellitus (CMS/[facility]) 1/16/2012   • Discoid lupus    • Diverticulosis 10/08/2003   • Diverticulosis    • Dyslipidemia 5/7/2012   • Eczema    • Erectile dysfunction    • Esophageal reflux 8/24/2009   • Gastritis 11/27/2018    Dr. Thurman   • Hematoma complicating a procedure 5/3/2012    Right groin, post cath of 4/18/2012    • History of cardiac catheterization 6/6/2014     4/18/2012 SUMMARY:  1. Severe 3-vessel coronary artery disease.  2. Patent bypass grafts.  3. Severe disease and bypass graft, aorta to posterior descending artery after anastomosis distally.     • HTN (hypertension), benign 12/6/2011   • Hyperlipidemia    • Hypertension    • Impaired functional mobility, balance, gait, and endurance    • Liver disease, granuloma 2/5/2014   • Lung nodule    • Malignant neoplasm (CMS/HCC) 5 YEARSAGO    SKIN CANCER ON LT  FOREHEAD   • Mitral valve regurgitation 7/16/2015    Moderate per ECHO 7/13/2015    • Myocardial infarct (CMS/HCC)     6/2000---4 vessel bypass   • Obesity, unspecified 6/10/2013   • Osteopenia 05/20/2010   • Other specified gastritis without mention of hemorrhage 8/24/2009   • Pain, chronic 2018    Lower back   • Pneumonia    • Postsurgical aortocoronary bypass status 4/10/2012   • Schatzki's ring, Dilated 11/27/2018    Dr. Thurman   • Sciatica    • Short-segment Hooks's esophagus 8/24/2009   • Syncope    • Wears glasses        Past Surgical History:   Procedure Laterality Date   • Anes chemosurg mohs tech 2nd stage up to 5 specmn Right 04/18/2018    Basal cell carcinoma right nasal tip   • Back surgery  2/10/2011    LAMINECTOMY   • Cabg, arterial, four+  6/2000   • Cardiac catheterization/possible ptca/possible stent  4/18/2012    preop clearance for back   • Cardiac catheterization/possible ptca/possible stent  02/14/2019   • Cardiac surgery     • Colonoscopy diagnostic  10/08/2003    Diverticulosis, otherwise examination was normal, f/u 10 yrs   • Colonoscopy diagnostic  10-    nml   • Dexa bone density axial skeleton  05/21/2010   • Egd  07/10/2020    repeat EGD 3 yr, hx Hooks's, Dr. Marroquin   • Esophagogastroduodenoscopy  6-2016    normal. repeat 3-5 yrs   • Esophagogastroduodenoscopy transoral flex w/bx single or mult  08/24/2009    short seg Barretts , gastritis, Dr Gibson  1yr   • Esophagogastroduodenoscopy transoral flex w/bx single or mult   06/02/2008    H pylori results not in chart-Esophageal mucosal changes consistent with short segment barretts esophagus, normal stomach, normal duodenum   • Esophagogastroduodenoscopy transoral flex w/bx single or mult  11/12/2007    H pylori negative-Distal esophagus bx-chronic esophagitis with intestinal metaplasia, focal glandular atypia, indefinite for low grade dysplasia   • Esophagogastroduodenoscopy transoral flex w/bx single or mult  06/05/2006    H pylori negative-Esophagus, lower/distal-barretts esophagus, no dysplasia is seen chronic inflammation of gastric type mucosa, esophageal mucosa with mild nonspecific reactive changes   • Esophagogastroduodenoscopy transoral flex w/bx single or mult  10-5-2010    barretts - sht segment   • Esophagogastroduodenoscopy transoral flex w/bx single or mult  10-    SS Barretts, no dysplasia   • Esophagogastroduodenoscopy transoral flex w/bx single or mult  4-2-2013    grossly nml esoph , gastritis   • Esophagogastroduodenoscopy transoral flex w/bx single or mult  11/27/2018    Dr. Thurman/ EGD/ Schatzki's Ring dilated and gastritis Recall 2 years   • Esophagoscopy transoral flex w/bx  3-    grossly nml.  Bxs at eg jx for barretts, 1 yr recall   • Esophagoscopy,ablation tumor  1-    SS Barretts - 30 ablations   • Eye surgery      SONIA CATARACT SURGERY   • Paravertebral facet inj jnt lumbar sacral 1  11/07/2018    Lumbar Facet / MBB #1   • Past surgical history  01/01/2001    retinal peal, left eye   • Removal of tonsils,<12 y/o  01[birth year]01[birth year]1950    Tonsillectomy Alone   • Remv cataract extracap insert lens  01/01/2001    Cataract Removal Lens Implant, left -one month after prior eye surgery   • Rev upper eyelid w excess skin  04/01/2006    Blepharoplasty, bilateral with an eyebrow tuck   • Rev upper eyelid w excess skin  04/01/2007    Blepharoplasty, right eye   • Spine surgery procedure unlisted  08/01/2003    micro diskectomy of S2; knicked S1 nerve root    • Thoracentesis     • Tonsillectomy and adenoidectomy  AGE 5       Family History:  Family History   Problem Relation Age of Onset   • Stroke Father    • Heart disease Mother    • Heart Mother    • Diabetes Sister    • Heart disease Sister    • Cancer Sister         Breast cancer   • Diabetes Other    • Cancer Other 40         of melanoma       Social History:  Social History     Tobacco Use   • Smoking status: Former Smoker     Packs/day: 2.00     Years: 22.00     Pack years: 44.00     Types: Cigarettes     Start date:      Quit date:      Years since quittin.8   • Smokeless tobacco: Never Used   Substance Use Topics   • Alcohol use: Yes     Alcohol/week: 14.0 standard drinks     Types: 14 Cans of beer per week     Frequency: 4 or more times a week     Drinks per session: 3 or 4     Binge frequency: Never     Comment: \"Light beer\" per patient         PHYSICAL EXAM     Physical Exam   Constitutional: He is oriented to person, place, and time. He appears well-developed and well-nourished. No distress.   HENT:   Head: Normocephalic and atraumatic.   Neck: No tracheal deviation present.   Cardiovascular: Intact distal pulses.   Pulses:       Dorsalis pedis pulses are 2+ on the right side and 2+ on the left side.        Posterior tibial pulses are 2+ on the right side and 2+ on the left side.   Pulmonary/Chest: Effort normal. No respiratory distress.   Musculoskeletal:         General: No edema.      Lumbar back: He exhibits decreased range of motion.   Neurological: He is alert and oriented to person, place, and time. No sensory deficit. Coordination and gait normal.   Reflex Scores:       Patellar reflexes are 2+ on the right side and 2+ on the left side.       Achilles reflexes are 1+ on the right side and 0 on the left side.  Straight leg raise negative.  No clonus.  Strength:  5/5 in all muscle groups tested, bilateral lower extremities, except left gastrocsoleus are 4-/5.    Skin: Skin is  83-year-old female, history of CAD, diabetes, GERD, lung CA in remission, presenting with 2 days onset of supraumbilical/epigastric abdominal pain.  Reports pain is 8 out of 10, worsening with movement.  Nonradiating.  No associated symptoms of nausea, vomiting, diarrhea, fever, urinary symptoms, chest pain, shortness of breath.  No similar experience in the past.  Patient has unremarkable cardiac workup done 3 months ago. warm and dry. No cyanosis. Nails show no clubbing.   Psychiatric: He has a normal mood and affect. His behavior is normal.       Imaging:  An MRI of lumbar spine from October 2018 was reviewed and compared to a more recent evaluation from March 2020. There is multilevel spondylosis. There is clumping of the nerve roots from L3-S1. There are L4 and L5 laminectomy defects. There is moderate bilateral bilateral L3-4 and L4-5 lateral recess stenosis secondary to facet joint hypertrophy and broad-based disc osteophyte complex formation. There is minimal interval change comparing the October 2018 and March 2019 studies.    ASSESSMENT/PLAN     Assessment:  Lumbar postlaminectomy syndrome  Arachnoiditis  Left S1 nerve root injury  Lumbar spondylosis  Lumbar facet arthrosis  Neuropathic pain    Discussion:  I discussed the clinical and radiographic findings. I discussed the natural history of the above disorders. I discussed treatment options. Michele has symptoms concerning for neuropathic pain.  Based on his currently available radiographic studies, I would not recommend surgical intervention.  Performing multilevel decompression and fusion surgery would not be expected to improve his overall level of comfort or function.    Michele understands that the presence of arachnoiditis significantly worsens his overall prognosis. Performing surgery in the presence of arachnoiditis makes overall recovery unpredictable. I advised Michele to avoid surgery for as long as possible.    Michele may benefit from a spinal cord stimulator trial.  He will address this option when he sees Dr. Alcocer in the near future.    I recommend bilateral lower extremity nerve studies for further evaluation of the situation.    Plan:  Refer for bilateral lower extremity nerve studies  Continue non operative care  Consider a spinal cord stimulator trial per Dr. Alcocer  Michele will contact me if symptoms persist or worsen      Greater than 50% of this time  was spent counseling the patient and coordinating care.       A carbon copy of this report will be sent to Dr. Gardner.

## 2024-01-25 NOTE — ED PROVIDER NOTE - NSFOLLOWUPINSTRUCTIONS_ED_ALL_ED_FT
Please follow with a GI physican. List of providers have been given.     A prescription for Augmentin 875mg three times a day for 7 days was sent to your pharmacy.     Diverticulitis is when small pouches in your colon (large intestine) get infected or swollen. This causes pain in the belly (abdomen) and watery poop (diarrhea).    These pouches are called diverticula. The pouches form in people who have a condition called diverticulosis.    What are the causes?  This condition may be caused by poop (stool) that gets trapped in the pouches in your colon. The poop lets germs (bacteria) grow in the pouches. This causes the infection.    What increases the risk?  You are more likely to get this condition if you have small pouches in your colon. The risk is higher if:    You are overweight or very overweight (obese).  You do not exercise enough.  You drink alcohol.  You smoke or use products with tobacco in them.  You eat a diet that has a lot of red meat such as beef, pork, or lamb.  You eat a diet that does not have enough fiber in it.  You are older than 40 years of age.    What are the signs or symptoms?  Pain in the belly. Pain is often on the left side, but it may be in other areas.  Fever and feeling cold.  Feeling like you may vomit.  Vomiting.  Having cramps.  Feeling full.  Changes to how often you poop.  Blood in your poop.    How is this treated?  Most cases are treated at home by:    Taking over-the-counter pain medicines.  Following a clear liquid diet.  Taking antibiotic medicines.  Resting.    Very bad cases may need to be treated at a hospital. This may include:    Not eating or drinking.  Taking prescription pain medicine.  Getting antibiotic medicines through an IV tube.  Getting fluid and food through an IV tube.  Having surgery.    When you are feeling better, your doctor may tell you to have a test to check your colon (colonoscopy).    Follow these instructions at home:      Medicines    Take over-the-counter and prescription medicines only as told by your doctor. These include:    Antibiotics.  Pain medicines.  Fiber pills.  Probiotics.  Stool softeners.  If you were prescribed an antibiotic medicine, take it as told by your doctor. Do not stop taking the antibiotic even if you start to feel better.  Ask your doctor if the medicine prescribed to you requires you to avoid driving or using machinery.        Eating and drinking     Follow a diet as told by your doctor.  When you feel better, your doctor may tell you to change your diet. You may need to eat a lot of fiber. Fiber makes it easier to poop (have a bowel movement). Foods with fiber include:    Berries.  Beans.  Lentils.  Green vegetables.  Avoid eating red meat.        General instructions    Do not use any products that contain nicotine or tobacco, such as cigarettes, e-cigarettes, and chewing tobacco. If you need help quitting, ask your doctor.  Exercise 3 or more times a week. Try to get 30 minutes each time. Exercise enough to sweat and make your heart beat faster.  Keep all follow-up visits as told by your doctor. This is important.    Contact a doctor if:  Your pain does not get better.  You are not pooping like normal.    Get help right away if:  Your pain gets worse.  Your symptoms do not get better.  Your symptoms get worse very fast.  You have a fever.  You vomit more than one time.  You have poop that is:    Bloody.  Black.  Tarry.    Summary  This condition happens when small pouches in your colon get infected or swollen.  Take medicines only as told by your doctor.  Follow a diet as told by your doctor.  Keep all follow-up visits as told by your doctor. This is important.    ADDITIONAL NOTES AND INSTRUCTIONS    Please follow up with your Primary MD in 24-48 hr.  Seek immediate medical care for any new/worsening signs or symptoms.

## 2024-01-25 NOTE — ED PROVIDER NOTE - PHYSICAL EXAMINATION
Gen: Patient is well-appearing, NAD, AAOx3, able to ambulate without assistance  HEENT: NCAT, normal conjunctiva, tongue midline, oral mucosa moist  Lung: CTAB, no respiratory distress, no wheezes/rhonchi/rales B/L, speaking in full sentences  CV: RRR, no murmurs, rubs or gallops, distal pulses 2+ b/l  Abd: soft, RLQ and LUQ tenderness on palpation, ND, no guarding, no rigidity, no rebound tenderness, no CVA tenderness   MSK: no visible deformities, ROM normal in UE/LE, no back TTP  Neuro: No focal sensory or motor deficits  Skin: Warm, well perfused, no rash, no leg swelling  Psych: normal affect, calm

## 2024-01-26 VITALS
RESPIRATION RATE: 18 BRPM | HEART RATE: 77 BPM | TEMPERATURE: 98 F | SYSTOLIC BLOOD PRESSURE: 128 MMHG | OXYGEN SATURATION: 100 % | DIASTOLIC BLOOD PRESSURE: 57 MMHG

## 2024-01-26 LAB
BASOPHILS # BLD AUTO: 0.04 K/UL — SIGNIFICANT CHANGE UP (ref 0–0.2)
BASOPHILS # BLD AUTO: 0.04 K/UL — SIGNIFICANT CHANGE UP (ref 0–0.2)
BASOPHILS NFR BLD AUTO: 0.3 % — SIGNIFICANT CHANGE UP (ref 0–2)
BASOPHILS NFR BLD AUTO: 0.4 % — SIGNIFICANT CHANGE UP (ref 0–2)
EOSINOPHIL # BLD AUTO: 0.06 K/UL — SIGNIFICANT CHANGE UP (ref 0–0.5)
EOSINOPHIL # BLD AUTO: 0.06 K/UL — SIGNIFICANT CHANGE UP (ref 0–0.5)
EOSINOPHIL NFR BLD AUTO: 0.5 % — SIGNIFICANT CHANGE UP (ref 0–6)
EOSINOPHIL NFR BLD AUTO: 0.6 % — SIGNIFICANT CHANGE UP (ref 0–6)
HCT VFR BLD CALC: 36 % — SIGNIFICANT CHANGE UP (ref 34.5–45)
HCT VFR BLD CALC: 41.2 % — SIGNIFICANT CHANGE UP (ref 34.5–45)
HGB BLD-MCNC: 11.6 G/DL — SIGNIFICANT CHANGE UP (ref 11.5–15.5)
HGB BLD-MCNC: 13 G/DL — SIGNIFICANT CHANGE UP (ref 11.5–15.5)
IANC: 7.33 K/UL — SIGNIFICANT CHANGE UP (ref 1.8–7.4)
IANC: 8.73 K/UL — HIGH (ref 1.8–7.4)
IMM GRANULOCYTES NFR BLD AUTO: 0.4 % — SIGNIFICANT CHANGE UP (ref 0–0.9)
IMM GRANULOCYTES NFR BLD AUTO: 0.4 % — SIGNIFICANT CHANGE UP (ref 0–0.9)
LYMPHOCYTES # BLD AUTO: 2.15 K/UL — SIGNIFICANT CHANGE UP (ref 1–3.3)
LYMPHOCYTES # BLD AUTO: 2.76 K/UL — SIGNIFICANT CHANGE UP (ref 1–3.3)
LYMPHOCYTES # BLD AUTO: 20.2 % — SIGNIFICANT CHANGE UP (ref 13–44)
LYMPHOCYTES # BLD AUTO: 21.5 % — SIGNIFICANT CHANGE UP (ref 13–44)
MCHC RBC-ENTMCNC: 29.1 PG — SIGNIFICANT CHANGE UP (ref 27–34)
MCHC RBC-ENTMCNC: 29.3 PG — SIGNIFICANT CHANGE UP (ref 27–34)
MCHC RBC-ENTMCNC: 31.6 GM/DL — LOW (ref 32–36)
MCHC RBC-ENTMCNC: 32.2 GM/DL — SIGNIFICANT CHANGE UP (ref 32–36)
MCV RBC AUTO: 90.2 FL — SIGNIFICANT CHANGE UP (ref 80–100)
MCV RBC AUTO: 92.8 FL — SIGNIFICANT CHANGE UP (ref 80–100)
MONOCYTES # BLD AUTO: 1 K/UL — HIGH (ref 0–0.9)
MONOCYTES # BLD AUTO: 1.21 K/UL — HIGH (ref 0–0.9)
MONOCYTES NFR BLD AUTO: 9.4 % — SIGNIFICANT CHANGE UP (ref 2–14)
MONOCYTES NFR BLD AUTO: 9.4 % — SIGNIFICANT CHANGE UP (ref 2–14)
NEUTROPHILS # BLD AUTO: 7.33 K/UL — SIGNIFICANT CHANGE UP (ref 1.8–7.4)
NEUTROPHILS # BLD AUTO: 8.73 K/UL — HIGH (ref 1.8–7.4)
NEUTROPHILS NFR BLD AUTO: 67.9 % — SIGNIFICANT CHANGE UP (ref 43–77)
NEUTROPHILS NFR BLD AUTO: 69 % — SIGNIFICANT CHANGE UP (ref 43–77)
NRBC # BLD: 0 /100 WBCS — SIGNIFICANT CHANGE UP (ref 0–0)
NRBC # BLD: 0 /100 WBCS — SIGNIFICANT CHANGE UP (ref 0–0)
NRBC # FLD: 0 K/UL — SIGNIFICANT CHANGE UP (ref 0–0)
NRBC # FLD: 0 K/UL — SIGNIFICANT CHANGE UP (ref 0–0)
PLATELET # BLD AUTO: 221 K/UL — SIGNIFICANT CHANGE UP (ref 150–400)
PLATELET # BLD AUTO: 224 K/UL — SIGNIFICANT CHANGE UP (ref 150–400)
RBC # BLD: 3.99 M/UL — SIGNIFICANT CHANGE UP (ref 3.8–5.2)
RBC # BLD: 4.44 M/UL — SIGNIFICANT CHANGE UP (ref 3.8–5.2)
RBC # FLD: 13.9 % — SIGNIFICANT CHANGE UP (ref 10.3–14.5)
RBC # FLD: 14.4 % — SIGNIFICANT CHANGE UP (ref 10.3–14.5)
TROPONIN T, HIGH SENSITIVITY RESULT: 20 NG/L — SIGNIFICANT CHANGE UP
WBC # BLD: 10.62 K/UL — HIGH (ref 3.8–10.5)
WBC # BLD: 12.85 K/UL — HIGH (ref 3.8–10.5)
WBC # FLD AUTO: 10.62 K/UL — HIGH (ref 3.8–10.5)
WBC # FLD AUTO: 12.85 K/UL — HIGH (ref 3.8–10.5)

## 2024-01-26 RX ORDER — AMPICILLIN SODIUM AND SULBACTAM SODIUM 250; 125 MG/ML; MG/ML
3 INJECTION, POWDER, FOR SUSPENSION INTRAMUSCULAR; INTRAVENOUS ONCE
Refills: 0 | Status: COMPLETED | OUTPATIENT
Start: 2024-01-26 | End: 2024-01-26

## 2024-01-26 RX ADMIN — AMPICILLIN SODIUM AND SULBACTAM SODIUM 200 GRAM(S): 250; 125 INJECTION, POWDER, FOR SUSPENSION INTRAMUSCULAR; INTRAVENOUS at 00:29

## 2024-01-26 NOTE — ED ADULT NURSE REASSESSMENT NOTE - NS ED NURSE REASSESS COMMENT FT1
Break RN: NPt received in stretcher in room. Pt resting comfortably. pt offered no complains at present. respiration even and non-labored. in NAD. Awaiting for dispo

## 2024-01-27 LAB
CULTURE RESULTS: SIGNIFICANT CHANGE UP
SPECIMEN SOURCE: SIGNIFICANT CHANGE UP

## 2024-08-05 NOTE — DISCHARGE NOTE PROVIDER - ATTENDING ATTESTATION STATEMENT
Assessment: Improving excoriation, now on Zinc, previously Triad. Followed with Wound NP previously.     Plan:  Continue Zinc 20% cream for diaper care, per Dina can fluctuate between 20-40% as needed.         I have personally seen and examined the patient. I have collaborated with and supervised the

## 2025-01-28 NOTE — ED ADULT TRIAGE NOTE - SOURCE OF INFORMATION
Detail Level: Simple Photo Preface (Leave Blank If You Do Not Want): Photographs were obtained today Patient

## 2025-03-27 NOTE — ED PROVIDER NOTE - RESPIRATORY NEGATIVE STATEMENT, MLM
Pt given information on how to access pt portal at checkout Detail Level: Detailed no chest pain, no cough, and no shortness of breath.

## 2025-05-20 NOTE — ED ADULT NURSE NOTE - NS ED NOTE ABUSE RESPONSE YN
Patient reports green discharge coming from her incision site. She reports small levels of pain and a tightness.   
Yes
No abnormalities